# Patient Record
Sex: FEMALE | Race: BLACK OR AFRICAN AMERICAN | Employment: UNEMPLOYED | URBAN - METROPOLITAN AREA
[De-identification: names, ages, dates, MRNs, and addresses within clinical notes are randomized per-mention and may not be internally consistent; named-entity substitution may affect disease eponyms.]

---

## 2017-02-09 ENCOUNTER — ANESTHESIA (OUTPATIENT)
Dept: ENDOSCOPY | Age: 57
End: 2017-02-09
Payer: COMMERCIAL

## 2017-02-09 ENCOUNTER — SURGERY (OUTPATIENT)
Age: 57
End: 2017-02-09

## 2017-02-09 ENCOUNTER — ANESTHESIA EVENT (OUTPATIENT)
Dept: ENDOSCOPY | Age: 57
End: 2017-02-09
Payer: COMMERCIAL

## 2017-02-09 ENCOUNTER — HOSPITAL ENCOUNTER (OUTPATIENT)
Age: 57
Setting detail: OUTPATIENT SURGERY
Discharge: HOME | End: 2017-02-09
Attending: INTERNAL MEDICINE | Admitting: INTERNAL MEDICINE
Payer: COMMERCIAL

## 2017-02-09 VITALS
WEIGHT: 213 LBS | BODY MASS INDEX: 40.22 KG/M2 | RESPIRATION RATE: 18 BRPM | HEIGHT: 61 IN | OXYGEN SATURATION: 100 % | SYSTOLIC BLOOD PRESSURE: 122 MMHG | HEART RATE: 71 BPM | DIASTOLIC BLOOD PRESSURE: 76 MMHG | TEMPERATURE: 97.6 F

## 2017-02-09 PROCEDURE — 74011000250 HC RX REV CODE- 250

## 2017-02-09 PROCEDURE — 76060000031 HC ANESTHESIA FIRST 0.5 HR: Performed by: INTERNAL MEDICINE

## 2017-02-09 PROCEDURE — 74011250636 HC RX REV CODE- 250/636

## 2017-02-09 PROCEDURE — 77030009426 HC FCPS BIOP ENDOSC BSC -B: Performed by: INTERNAL MEDICINE

## 2017-02-09 PROCEDURE — 88305 TISSUE EXAM BY PATHOLOGIST: CPT | Performed by: INTERNAL MEDICINE

## 2017-02-09 PROCEDURE — 76040000019: Performed by: INTERNAL MEDICINE

## 2017-02-09 RX ORDER — ATROPINE SULFATE 0.1 MG/ML
0.5 INJECTION INTRAVENOUS
Status: DISCONTINUED | OUTPATIENT
Start: 2017-02-09 | End: 2017-02-09 | Stop reason: HOSPADM

## 2017-02-09 RX ORDER — NALOXONE HYDROCHLORIDE 0.4 MG/ML
0.4 INJECTION, SOLUTION INTRAMUSCULAR; INTRAVENOUS; SUBCUTANEOUS
Status: DISCONTINUED | OUTPATIENT
Start: 2017-02-09 | End: 2017-02-09 | Stop reason: HOSPADM

## 2017-02-09 RX ORDER — EPINEPHRINE 0.1 MG/ML
1 INJECTION INTRACARDIAC; INTRAVENOUS
Status: DISCONTINUED | OUTPATIENT
Start: 2017-02-09 | End: 2017-02-09 | Stop reason: HOSPADM

## 2017-02-09 RX ORDER — HYDROCHLOROTHIAZIDE 25 MG/1
25 TABLET ORAL DAILY
COMMUNITY

## 2017-02-09 RX ORDER — LIDOCAINE HYDROCHLORIDE 20 MG/ML
INJECTION, SOLUTION EPIDURAL; INFILTRATION; INTRACAUDAL; PERINEURAL AS NEEDED
Status: DISCONTINUED | OUTPATIENT
Start: 2017-02-09 | End: 2017-02-09 | Stop reason: HOSPADM

## 2017-02-09 RX ORDER — FENTANYL CITRATE 50 UG/ML
100 INJECTION, SOLUTION INTRAMUSCULAR; INTRAVENOUS
Status: DISCONTINUED | OUTPATIENT
Start: 2017-02-09 | End: 2017-02-09 | Stop reason: HOSPADM

## 2017-02-09 RX ORDER — FLUMAZENIL 0.1 MG/ML
0.2 INJECTION INTRAVENOUS
Status: DISCONTINUED | OUTPATIENT
Start: 2017-02-09 | End: 2017-02-09 | Stop reason: HOSPADM

## 2017-02-09 RX ORDER — SODIUM CHLORIDE 0.9 % (FLUSH) 0.9 %
5-10 SYRINGE (ML) INJECTION AS NEEDED
Status: DISCONTINUED | OUTPATIENT
Start: 2017-02-09 | End: 2017-02-09 | Stop reason: HOSPADM

## 2017-02-09 RX ORDER — SODIUM CHLORIDE 0.9 % (FLUSH) 0.9 %
5-10 SYRINGE (ML) INJECTION EVERY 8 HOURS
Status: DISCONTINUED | OUTPATIENT
Start: 2017-02-09 | End: 2017-02-09 | Stop reason: HOSPADM

## 2017-02-09 RX ORDER — MIDAZOLAM HYDROCHLORIDE 1 MG/ML
.25-1 INJECTION, SOLUTION INTRAMUSCULAR; INTRAVENOUS
Status: DISCONTINUED | OUTPATIENT
Start: 2017-02-09 | End: 2017-02-09 | Stop reason: HOSPADM

## 2017-02-09 RX ORDER — PROPOFOL 10 MG/ML
INJECTION, EMULSION INTRAVENOUS AS NEEDED
Status: DISCONTINUED | OUTPATIENT
Start: 2017-02-09 | End: 2017-02-09 | Stop reason: HOSPADM

## 2017-02-09 RX ORDER — DEXTROMETHORPHAN/PSEUDOEPHED 2.5-7.5/.8
1.2 DROPS ORAL
Status: DISCONTINUED | OUTPATIENT
Start: 2017-02-09 | End: 2017-02-09 | Stop reason: HOSPADM

## 2017-02-09 RX ORDER — DIPHENHYDRAMINE HYDROCHLORIDE 50 MG/ML
50 INJECTION, SOLUTION INTRAMUSCULAR; INTRAVENOUS ONCE
Status: DISCONTINUED | OUTPATIENT
Start: 2017-02-09 | End: 2017-02-09 | Stop reason: HOSPADM

## 2017-02-09 RX ORDER — SODIUM CHLORIDE 9 MG/ML
INJECTION, SOLUTION INTRAVENOUS
Status: DISCONTINUED | OUTPATIENT
Start: 2017-02-09 | End: 2017-02-09 | Stop reason: HOSPADM

## 2017-02-09 RX ORDER — SODIUM CHLORIDE 9 MG/ML
100 INJECTION, SOLUTION INTRAVENOUS CONTINUOUS
Status: DISCONTINUED | OUTPATIENT
Start: 2017-02-09 | End: 2017-02-09 | Stop reason: HOSPADM

## 2017-02-09 RX ADMIN — LIDOCAINE HYDROCHLORIDE 60 MG: 20 INJECTION, SOLUTION EPIDURAL; INFILTRATION; INTRACAUDAL; PERINEURAL at 11:29

## 2017-02-09 RX ADMIN — PROPOFOL 30 MG: 10 INJECTION, EMULSION INTRAVENOUS at 11:31

## 2017-02-09 RX ADMIN — PROPOFOL 30 MG: 10 INJECTION, EMULSION INTRAVENOUS at 11:33

## 2017-02-09 RX ADMIN — PROPOFOL 10 MG: 10 INJECTION, EMULSION INTRAVENOUS at 11:32

## 2017-02-09 RX ADMIN — PROPOFOL 80 MG: 10 INJECTION, EMULSION INTRAVENOUS at 11:29

## 2017-02-09 RX ADMIN — SODIUM CHLORIDE: 9 INJECTION, SOLUTION INTRAVENOUS at 11:16

## 2017-02-09 NOTE — ANESTHESIA PREPROCEDURE EVALUATION
Anesthetic History   No history of anesthetic complications            Review of Systems / Medical History  Patient summary reviewed, nursing notes reviewed and pertinent labs reviewed    Pulmonary  Within defined limits      Sleep apnea           Neuro/Psych   Within defined limits           Cardiovascular  Within defined limits                     GI/Hepatic/Renal  Within defined limits   GERD           Endo/Other  Within defined limits      Obesity and arthritis     Other Findings              Physical Exam    Airway  Mallampati: II  TM Distance: > 6 cm  Neck ROM: normal range of motion   Mouth opening: Normal     Cardiovascular  Regular rate and rhythm,  S1 and S2 normal,  no murmur, click, rub, or gallop             Dental  No notable dental hx       Pulmonary  Breath sounds clear to auscultation               Abdominal  GI exam deferred       Other Findings            Anesthetic Plan    ASA: 2  Anesthesia type: MAC          Induction: Intravenous  Anesthetic plan and risks discussed with: Patient

## 2017-02-09 NOTE — PROGRESS NOTES
Aislinn Brown  1960  [id number]    Situation:  Verbal report received from: DINESH THOMASON RN  Procedure: Procedure(s):  ESOPHAGOGASTRODUODENOSCOPY (EGD)  ESOPHAGOGASTRODUODENAL (EGD) BIOPSY    Background:    Preoperative diagnosis: DYSPHAGIA  Postoperative diagnosis: Dysphagia  Hiatial Hernia    :  Dr. Nii Blackman  Assistant(s): Endoscopy Technician-1: Shira Salcedo  Endoscopy RN-1: Clarissa Mendez RN    Specimens:   ID Type Source Tests Collected by Time Destination   1 : esophagus Preservative Esophagus, Distal  Jayant P. Walton Boxer, MD 2/9/2017 1139 Pathology     H. Pylori  no    Assessment:  Intra-procedure medications   Anesthesia gave intra-procedure sedation and medications, see anesthesia flow sheet yes    Intravenous fluids: NS@ KVO     Vital signs stable YES    Abdominal assessment: round and soft YES    Recommendation:  Discharge patient per MD order YES.   Return to floor N/A  Family or Friend YES  Permission to share finding with family or friend yes

## 2017-02-09 NOTE — ANESTHESIA POSTPROCEDURE EVALUATION
Post-Anesthesia Evaluation and Assessment    Patient: Aislinn Brown MRN: [id number]  SSN: [id number]    YOB: 1960  Age: 49 y.o. Sex: female       Cardiovascular Function/Vital Signs  Visit Vitals    /66    Pulse 74    Temp 36.4 °C (97.6 °F)    Resp 12    Ht 5' 1.25\" (1.556 m)    Wt 96.6 kg (213 lb)    SpO2 98%    BMI 39.92 kg/m2       Patient is status post MAC anesthesia for Procedure(s):  ESOPHAGOGASTRODUODENOSCOPY (EGD)  ESOPHAGOGASTRODUODENAL (EGD) BIOPSY. Nausea/Vomiting: None    Postoperative hydration reviewed and adequate. Pain:  Pain Scale 1: Numeric (0 - 10) (02/09/17 1149)  Pain Intensity 1: 0 (02/09/17 1149)   Managed    Neurological Status: At baseline    Mental Status and Level of Consciousness: Arousable    Pulmonary Status:   O2 Device: Room air (02/09/17 1149)   Adequate oxygenation and airway patent    Complications related to anesthesia: None    Post-anesthesia assessment completed.  No concerns    Signed By: Janelle Zhang MD     February 9, 2017

## 2017-02-09 NOTE — H&P
[address] [place] [address], [address]      History and Physical       NAME:  Aislinn Brown   :   1960   MRN:   [id number]             History of Present Illness:  Patient is a 49 y. o. who is seen for dysphagia. PMH:  Past Medical History   Diagnosis Date    Anxiety     Arthritis     Bipolar 1 disorder ([facility] [address]     Degenerative disk disease     Depression     Diabetes ([facility] [address]      \"border line\"    GERD (gastroesophageal reflux disease)     Mood disorder (HCC)      affective    JOSE RAFAEL (obstructive sleep apnea) 2012     AHI: 8.5 per hour of sleep       PSH:  No past surgical history on file. Allergies: Allergies   Allergen Reactions    Pcn [Penicillins] Hives    Sulfa (Sulfonamide Antibiotics) Hives       Home Medications:  Prior to Admission Medications   Prescriptions Last Dose Informant Patient Reported? Taking? DICLOFENAC SODIUM PO 2017 at Unknown time  Yes Yes   Sig: Take 1 Tab by mouth daily. OXYCODONE HCL (OXYCODONE PO) Not Taking at Unknown time  Yes No   Sig: Take  by mouth. 1 tab every 6 hrs/    alprazolam (XANAX) 0.5 mg tablet Unknown at Unknown time  Yes No   Sig: Take  by mouth nightly as needed. calcium 500 mg tab Not Taking at Unknown time  Yes No   Sig: Take 1 Tab by mouth daily. hydroCHLOROthiazide (HYDRODIURIL) 25 mg tablet 2017 at Unknown time  Yes Yes   Sig: Take 25 mg by mouth daily. multivitamin (ONE A DAY) tablet 2017 at Unknown time  Yes Yes   Sig: Take 1 Tab by mouth daily. omeprazole (PRILOSEC) 20 mg capsule 2017 at Unknown time  Yes Yes   Sig: Take 20 mg by mouth daily. risperidone (RISPERDAL) 4 mg tablet Not Taking at Unknown time  Yes No   Sig: Take  by mouth daily.       Facility-Administered Medications: None       Hospital Medications:  Current Facility-Administered Medications   Medication Dose Route Frequency    0.9% sodium chloride infusion  100 mL/hr IntraVENous CONTINUOUS    sodium chloride (NS) flush 5-10 mL  5-10 mL IntraVENous Q8H    sodium chloride (NS) flush 5-10 mL  5-10 mL IntraVENous PRN    midazolam (VERSED) injection 0.25-10 mg  0.25-10 mg IntraVENous Multiple    fentaNYL citrate (PF) injection 100 mcg  100 mcg IntraVENous Multiple    naloxone (NARCAN) injection 0.4 mg  0.4 mg IntraVENous Multiple    flumazenil (ROMAZICON) 0.1 mg/mL injection 0.2 mg  0.2 mg IntraVENous Multiple    simethicone (MYLICON) 08BE/0.3MM oral drops 80 mg  1.2 mL Oral Multiple    diphenhydrAMINE (BENADRYL) injection 50 mg  50 mg IntraVENous ONCE    atropine injection 0.5 mg  0.5 mg IntraVENous ONCE PRN    EPINEPHrine (ADRENALIN) 0.1 mg/mL syringe 1 mg  1 mg Endoscopically ONCE PRN       Social History:  Social History   Substance Use Topics    Smoking status: Former Smoker    Smokeless tobacco: Never Used    Alcohol use 0.0 oz/week     0 Standard drinks or equivalent per week      Comment: rarely       Family History:  Family History   Problem Relation Age of Onset    Breast Cancer Paternal Aunt     Breast Cancer Cousin              Review of Systems:      Constitutional: negative fever, negative chills, negative weight loss  Eyes:   negative visual changes  ENT:   negative sore throat, tongue or lip swelling  Respiratory:  negative cough, negative dyspnea  Cards:  negative for chest pain, palpitations, lower extremity edema  GI:   See HPI  :  negative for frequency, dysuria  Integument:  negative for rash and pruritus  Heme:  negative for easy bruising and gum/nose bleeding  Musculoskel: negative for myalgias,  back pain and muscle weakness  Neuro: negative for headaches, dizziness, vertigo  Psych:  negative for feelings of anxiety, depression       Objective:   Patient Vitals for the past 8 hrs:   BP Temp Pulse Resp SpO2 Height Weight   02/09/17 0956 138/74 98 °F (36.7 °C) 78 20 98 % 5' 1.25\" (1.556 m) 96.6 kg (213 lb)             EXAM:     NEURO-a&o   HEENT-wnl   LUNGS-clear    COR-regular rate and rhythym     ABD-soft , no tenderness, no rebound, good bs     EXT-no edema     Data Review     No results for input(s): WBC, HGB, HCT, PLT, HGBEXT, HCTEXT, PLTEXT in the last 72 hours. No results for input(s): NA, K, CL, CO2, BUN, CREA, GLU, PHOS, CA in the last 72 hours. No results for input(s): SGOT, GPT, AP, TBIL, TP, ALB, GLOB, GGT, AML, LPSE in the last 72 hours. No lab exists for component: AMYP, HLPSE  No results for input(s): INR, PTP, APTT in the last 72 hours. No lab exists for component: INREXT       Assessment:   · dysphagia     Patient Active Problem List   Diagnosis Code    Obstructive sleep apnea G47.33    Mood disorder (HCC) F39    Chronic pain syndrome G89.4    Morbid obesity ([facility] [address] E66.01     Plan:   · Endoscopic procedure with MAC     Signed By: Minnie Merino.  Becky Huynh MD     2/9/2017  11:14 AM

## 2017-02-09 NOTE — IP AVS SNAPSHOT
[address] 
[phone] Patient: Aislinn Brown MRN: [id number] BPJ:[birth year] You are allergic to the following Allergen Reactions Pcn (Penicillins) Hives Sulfa (Sulfonamide Antibiotics) Hives Recent Documentation Height Weight BMI OB Status Smoking Status 1.556 m 96.6 kg 39.92 kg/m2 Postmenopausal Former Smoker Emergency Contacts Name Discharge Info Relation Home Work [facility] DISCHARGE CAREGIVER [3] Spouse [3] [phone] About your hospitalization You were admitted on:  February 9, 2017 You last received care in the:  [facility] ENDOSCOPY You were discharged on:  February 9, 2017 Unit phone number:  [phone] Why you were hospitalized Your primary diagnosis was:  Not on File Providers Seen During Your Hospitalizations Provider Role Specialty Primary office phone Kye Browning MD Attending Provider Gastroenterology [phone] Your Primary Care Physician (PCP) Primary Care Physician Office Phone Office Fax Mini Rouse [phone] Follow-up Information None Current Discharge Medication List  
  
CONTINUE these medications which have NOT CHANGED Dose & Instructions Dispensing Information Comments Morning Noon Evening Bedtime ALPRAZolam 0.5 mg tablet Commonly known as:  Ganga Sequin Your next dose is: Today, Tomorrow Other:  _________ Take  by mouth nightly as needed. Refills:  0  
     
   
   
   
  
 calcium 500 mg Tab Your next dose is: Today, Tomorrow Other:  _________ Dose:  1 Tab Take 1 Tab by mouth daily. Refills:  0 DICLOFENAC SODIUM PO Your next dose is: Today, Tomorrow Other:  _________ Dose:  1 Tab Take 1 Tab by mouth daily. Refills:  0  
     
   
   
   
  
 hydroCHLOROthiazide 25 mg tablet Commonly known as:  HYDRODIURIL Your next dose is: Today, Tomorrow Other:  _________ Dose:  25 mg Take 25 mg by mouth daily. Refills:  0  
     
   
   
   
  
 multivitamin tablet Commonly known as:  ONE A DAY Your next dose is: Today, Tomorrow Other:  _________ Dose:  1 Tab Take 1 Tab by mouth daily. Refills:  0 OXYCODONE PO Your next dose is: Today, Tomorrow Other:  _________ Take  by mouth. 1 tab every 6 hrs/  
 Refills:  0 PriLOSEC 20 mg capsule Generic drug:  omeprazole Your next dose is: Today, Tomorrow Other:  _________ Dose:  20 mg Take 20 mg by mouth daily. Refills:  0  
     
   
   
   
  
 risperiDONE 4 mg tablet Commonly known as:  RisperDAL Your next dose is: Today, Tomorrow Other:  _________ Take  by mouth daily. Refills:  0 Discharge Instructions [address] 
[address] [facility], [address] [place] [address] EGD DISCHARGE INSTRUCTIONS Aislinn Brown [id number] 
1960 Discomfort: 
Sore throat- throat lozenges or warm salt water gargle 
redness at IV site- apply warm compress to area; if redness or soreness persist- contact your physician Gaseous discomfort- walking, belching will help relieve any discomfort You may not operate a vehicle for 12 hours You may not engage in an occupation involving machinery or appliances for rest of today You may not drink alcoholic beverages for at least 12 hours Avoid making any critical decisions for at least 24 hour DIET You may resume your regular diet  however -  remember your colon is empty and a heavy meal will produce gas. Avoid these foods:  vegetables, fried / greasy foods, carbonated drinks ACTIVITY You may resume your normal daily activities Spend the remainder of the day resting -  avoid any strenuous activity. CALL M.D. ANY SIGN OF Increasing pain, nausea, vomiting Abdominal distension (swelling) New increased bleeding (oral or rectal) Fever (chills) Pain in chest area Bloody discharge from nose or mouth Shortness of breath Follow-up Instructions: 
 Call Dr. Karan Amaral for any questions or problems. Telephone # [phone] ENDOSCOPY FINDINGS: 
 Your endoscopy showed a hiatal hernia. Please continue your omeprazole. Biopsies of your esophagus, which was otherwise normal appearing, were taken. We will contact you about the pathology results. Signed By: Sandeep Mohamud. Xiomara Garcia MD   
 2/9/2017  11:39 AM 
  
 
 
 
  
Hiatal Hernia: Care Instructions Your Care Instructions A hiatal hernia occurs when part of the stomach bulges into the chest cavity. A hiatal hernia may allow stomach acid and juices to back up into the esophagus (acid reflux). This can cause a feeling of burning, warmth, heat, or pain behind the breastbone. This feeling may often occur after you eat, soon after you lie down, or when you bend forward, and it may come and go. You also may have a sour taste in your mouth. These symptoms are commonly known as heartburn or reflux. But not all hiatal hernias cause symptoms. Follow-up care is a key part of your treatment and safety. Be sure to make and go to all appointments, and call your doctor if you are having problems. Its also a good idea to know your test results and keep a list of the medicines you take. How can you care for yourself at home? · Take your medicines exactly as prescribed. Call your doctor if you think you are having a problem with your medicine.  
· Do not take aspirin or other nonsteroidal anti-inflammatory drugs (NSAIDs), such as ibuprofen (Advil, Motrin) or naproxen (Aleve), unless your doctor says it is okay. Ask your doctor what you can take for pain. · Your doctor may recommend over-the-counter medicine. For mild or occasional indigestion, antacids such as Tums, Gaviscon, Maalox, or Mylanta may help. Your doctor also may recommend over-the-counter acid reducers, such as famotidine (Pepcid AC), cimetidine (Tagamet HB), ranitidine (Zantac 75 and Zantac 150), or omeprazole (Prilosec). Read and follow all instructions on the label. If you use these medicines often, talk with your doctor. · Change your eating habits. ¨ Its best to eat several small meals instead of two or three large meals. ¨ After you eat, wait 2 to 3 hours before you lie down. Late-night snacks arent a good idea. ¨ Chocolate, mint, and alcohol can make heartburn worse. They relax the valve between the esophagus and the stomach. ¨ Spicy foods, foods that have a lot of acid (like tomatoes and oranges), and coffee can make heartburn symptoms worse in some people. If your symptoms are worse after you eat a certain food, you may want to stop eating that food to see if your symptoms get better. · Do not smoke or chew tobacco. 
· If you get heartburn at night, raise the head of your bed 6 to 8 inches by putting the frame on blocks or placing a foam wedge under the head of your mattress. (Adding extra pillows does not work.) · Do not wear tight clothing around your middle. · Lose weight if you need to. Losing just 5 to 10 pounds can help. When should you call for help? Call 911 anytime you think you may need emergency care. For example, call if: 
· You have symptoms of a heart attack such as: ¨ Chest pain or pressure. ¨ Sweating. ¨ Shortness of breath. ¨ Nausea or vomiting. ¨ Pain that spreads from the chest to the neck, jaw, or one or both shoulders or arms. ¨ Dizziness or lightheadedness. ¨ A fast or uneven pulse. After calling 911, chew 1 adult-strength aspirin. Wait for an ambulance. Do not try to drive yourself. · You vomit blood or what looks like coffee grounds. · You pass maroon or very bloody stools. Call your doctor now or seek immediate medical care if: 
· You have new or different belly pain. · Your stools are black and tarlike or have streaks of blood. · Food seems to catch in your throat or chest. 
Watch closely for changes in your health, and be sure to contact your doctor if: 
· Your symptoms get worse. · Your symptoms have not improved after 2 days. Where can you learn more? Go to http://dianne-zaida.info/. Enter I123 in the search box to learn more about \"Hiatal Hernia: Care Instructions. \" Current as of: August 9, 2016 Content Version: 11.1 © 8267-8525 contrib.com. Care instructions adapted under license by [employer] (which disclaims liability or warranty for this information). If you have questions about a medical condition or this instruction, always ask your healthcare professional. [place] [address] any warranty or liability for your use of this information. Discharge Orders None Introducing hospitals & HEALTH SERVICES! [employer] introduces [employer] patient portal. Now you can access parts of your medical record, email your doctor's office, and request medication refills online. 1. In your internet browser, go to [email or web address] [email or web address] 2. Click on the First Time User? Click Here link in the Sign In box. You will see the New Member Sign Up page. 3. Enter your [employer] Access Code exactly as it appears below. You will not need to use this code after youve completed the sign-up process. If you do not sign up before the expiration date, you must request a new code. · [employer] Access Code: [email or web address] Expires: 5/10/2017  9:09 AM 
 
4. Enter the last four digits of your Social Security Number (xxxx) and Date of Birth (mm/dd/yyyy) as indicated and click Submit.  You will be taken to the next sign-up page. 5. Create a [employer] ID. This will be your [employer] login ID and cannot be changed, so think of one that is secure and easy to remember. 6. Create a [employer] password. You can change your password at any time. 7. Enter your Password Reset Question and Answer. This can be used at a later time if you forget your password. 8. Enter your e-mail address. You will receive e-mail notification when new information is available in [address] 9. Click Sign Up. You can now view and download portions of your medical record. 10. Click the Download Summary menu link to download a portable copy of your medical information. If you have questions, please visit the Frequently Asked Questions section of the [employer] website. Remember, [employer] is NOT to be used for urgent needs. For medical emergencies, dial 911. Now available from your iPhone and Android! General Information Please provide this summary of care documentation to your next provider. Patient Signature:  ____________________________________________________________ Date:  ____________________________________________________________  
  
Patrick Artist Provider Signature:  ____________________________________________________________ Date:  ____________________________________________________________

## 2017-02-09 NOTE — PROCEDURES
[address], [address] [place] [address]          Esophago- Gastroduodenoscopy (EGD) Procedure Note    Aislinn Brown  1960  [id number]      Procedure: Endoscopic Gastroduodenoscopy --diagnostic, with biopsy    Indication: dysphagia    Pre-operative Diagnosis: see indication above    Post-operative Diagnosis: see findings below    : Jocelyne Feng MD    Referring Provider:  Dave Brice MD      Anesthesia/Sedation:  MAC anesthesia Propofol        Procedure Details     After informed consent was obtained for the procedure, with all risks and benefits of procedure explained the patient was taken to the endoscopy suite and placed in the left lateral decubitus position. Following sequential administration of sedation as per above, the endoscope was inserted into the mouth and advanced under direct vision to second portion of the duodenum. A careful inspection was made as the gastroscope was withdrawn, including a retroflexed view of the proximal stomach; findings and interventions are described below. Findings:   Esophagus: small hiatal hernia, cold biopsies taken of esophagus to rule out eosinophilic esophagitis  Stomach: normal   Duodenum: normal      Therapies:  As above    Specimens: 1. Esophagus         EBL: None      Complications:   None; patient tolerated the procedure well. Impression:    As above    Recommendations:  1. Follow up surgical pathology  2. Continue PPI    Signed By: Jocelyne Feng MD     2/9/2017  11:40 AM

## 2017-02-09 NOTE — DISCHARGE INSTRUCTIONS
[address] [facility] [place] [address], [address] [facility]    EGD DISCHARGE INSTRUCTIONS    Aislinn Brown  [id number]  1960    Discomfort:  Sore throat- throat lozenges or warm salt water gargle  redness at IV site- apply warm compress to area; if redness or soreness persist- contact your physician  Gaseous discomfort- walking, belching will help relieve any discomfort  You may not operate a vehicle for 12 hours  You may not engage in an occupation involving machinery or appliances for rest of today  You may not drink alcoholic beverages for at least 12 hours  Avoid making any critical decisions for at least 24 hour  DIET  You may resume your regular diet - however -  remember your colon is empty and a heavy meal will produce gas. Avoid these foods:  vegetables, fried / greasy foods, carbonated drinks    ACTIVITY  You may resume your normal daily activities   Spend the remainder of the day resting -  avoid any strenuous activity. CALL M.D. ANY SIGN OF   Increasing pain, nausea, vomiting  Abdominal distension (swelling)  New increased bleeding (oral or rectal)  Fever (chills)  Pain in chest area  Bloody discharge from nose or mouth  Shortness of breath    Follow-up Instructions:   Call Dr. Sedalia Barthel for any questions or problems. Telephone # [phone]    ENDOSCOPY FINDINGS:   Your endoscopy showed a hiatal hernia. Please continue your omeprazole. Biopsies of your esophagus, which was otherwise normal appearing, were taken. We will contact you about the pathology results. Signed By: Adelia Smith. Riddhi Wan MD     2/9/2017  11:39 AM              Hiatal Hernia: Care Instructions  Your Care Instructions  A hiatal hernia occurs when part of the stomach bulges into the chest cavity. A hiatal hernia may allow stomach acid and juices to back up into the esophagus (acid reflux). This can cause a feeling of burning, warmth, heat, or pain behind the breastbone.  This feeling may often occur after you eat, soon after you lie down, or when you bend forward, and it may come and go. You also may have a sour taste in your mouth. These symptoms are commonly known as heartburn or reflux. But not all hiatal hernias cause symptoms. Follow-up care is a key part of your treatment and safety. Be sure to make and go to all appointments, and call your doctor if you are having problems. Its also a good idea to know your test results and keep a list of the medicines you take. How can you care for yourself at home? · Take your medicines exactly as prescribed. Call your doctor if you think you are having a problem with your medicine. · Do not take aspirin or other nonsteroidal anti-inflammatory drugs (NSAIDs), such as ibuprofen (Advil, Motrin) or naproxen (Aleve), unless your doctor says it is okay. Ask your doctor what you can take for pain. · Your doctor may recommend over-the-counter medicine. For mild or occasional indigestion, antacids such as Tums, Gaviscon, Maalox, or Mylanta may help. Your doctor also may recommend over-the-counter acid reducers, such as famotidine (Pepcid AC), cimetidine (Tagamet HB), ranitidine (Zantac 75 and Zantac 150), or omeprazole (Prilosec). Read and follow all instructions on the label. If you use these medicines often, talk with your doctor. · Change your eating habits. ¨ Its best to eat several small meals instead of two or three large meals. ¨ After you eat, wait 2 to 3 hours before you lie down. Late-night snacks arent a good idea. ¨ Chocolate, mint, and alcohol can make heartburn worse. They relax the valve between the esophagus and the stomach. ¨ Spicy foods, foods that have a lot of acid (like tomatoes and oranges), and coffee can make heartburn symptoms worse in some people. If your symptoms are worse after you eat a certain food, you may want to stop eating that food to see if your symptoms get better.   · Do not smoke or chew tobacco.  · If you get heartburn at night, raise the head of your bed 6 to 8 inches by putting the frame on blocks or placing a foam wedge under the head of your mattress. (Adding extra pillows does not work.)  · Do not wear tight clothing around your middle. · Lose weight if you need to. Losing just 5 to 10 pounds can help. When should you call for help? Call 911 anytime you think you may need emergency care. For example, call if:  · You have symptoms of a heart attack such as:  ¨ Chest pain or pressure. ¨ Sweating. ¨ Shortness of breath. ¨ Nausea or vomiting. ¨ Pain that spreads from the chest to the neck, jaw, or one or both shoulders or arms. ¨ Dizziness or lightheadedness. ¨ A fast or uneven pulse. After calling 911, chew 1 adult-strength aspirin. Wait for an ambulance. Do not try to drive yourself. · You vomit blood or what looks like coffee grounds. · You pass maroon or very bloody stools. Call your doctor now or seek immediate medical care if:  · You have new or different belly pain. · Your stools are black and tarlike or have streaks of blood. · Food seems to catch in your throat or chest.  Watch closely for changes in your health, and be sure to contact your doctor if:  · Your symptoms get worse. · Your symptoms have not improved after 2 days. Where can you learn more? Go to http://dianne-zaida.info/. Enter F871 in the search box to learn more about \"Hiatal Hernia: Care Instructions. \"  Current as of: August 9, 2016  Content Version: 11.1  © 4076-5895 [employer]. Care instructions adapted under license by [employer] (which disclaims liability or warranty for this information). If you have questions about a medical condition or this instruction, always ask your healthcare professional. [address] any warranty or liability for your use of this information.

## 2017-10-31 ENCOUNTER — HOSPITAL ENCOUNTER (OUTPATIENT)
Dept: MAMMOGRAPHY | Age: 57
Discharge: HOME | End: 2017-10-31
Attending: INTERNAL MEDICINE
Payer: COMMERCIAL

## 2017-10-31 DIAGNOSIS — Z12.31 VISIT FOR SCREENING MAMMOGRAM: ICD-10-CM

## 2017-10-31 PROCEDURE — 77067 SCR MAMMO BI INCL CAD: CPT

## 2018-01-25 ENCOUNTER — TELEPHONE (OUTPATIENT)
Dept: SLEEP MEDICINE | Age: 58
End: 2018-01-25

## 2018-01-25 DIAGNOSIS — G47.33 OSA (OBSTRUCTIVE SLEEP APNEA): Primary | ICD-10-CM

## 2018-01-25 NOTE — TELEPHONE ENCOUNTER
Orders Placed This Encounter    AMB SUPPLY ORDER     Diagnosis: (G47.33) JOSE RAFAEL (obstructive sleep apnea)  (primary encounter diagnosis)     Replacement Supplies for Positive Airway Pressure Therapy Device:   Duration of need: 99 months.  Oral/Nasal Combo Mask 1 every 3 months.  Oral Cushion Combo Mask (Replace) 2 per month.  Nasal Pillows Combo Mask (Replace) 2 per month.  Full Face Mask 1 every 3 months.  Full Face Mask Cushion 1 per month.  Nasal Cushion (Replace) 2 per month.  Nasal Pillows (Replace) 2 per month.  Nasal Interface Mask 1 every 3 months.  Headgear 1 every 6 months.  Chinstrap 1 every 6 months.  Tubing 1 every 3 months.  Filter(s) Disposable 2 per month.  Filter(s) Non-Disposable 1 every 6 months.  Oral Interface 1 every 3 months. [address] for [employer] (Replace) 1 every 6 months.  Tubing with heating element 1 every 3 months. Perform Mask Fitting per patient preference and comfort - replace as above. Philomena Basurto MD, FAA; NPI: [id number]    Electronically signed.  Date:- 01/25/18

## 2018-01-26 ENCOUNTER — DOCUMENTATION ONLY (OUTPATIENT)
Dept: SLEEP MEDICINE | Age: 58
End: 2018-01-26

## 2018-05-15 ENCOUNTER — DOCUMENTATION ONLY (OUTPATIENT)
Dept: SLEEP MEDICINE | Age: 58
End: 2018-05-15

## 2018-05-15 ENCOUNTER — OFFICE VISIT (OUTPATIENT)
Dept: SLEEP MEDICINE | Age: 58
End: 2018-05-15

## 2018-05-15 VITALS
WEIGHT: 216.8 LBS | OXYGEN SATURATION: 98 % | BODY MASS INDEX: 40.93 KG/M2 | SYSTOLIC BLOOD PRESSURE: 140 MMHG | DIASTOLIC BLOOD PRESSURE: 91 MMHG | HEIGHT: 61 IN

## 2018-05-15 DIAGNOSIS — G47.33 OBSTRUCTIVE SLEEP APNEA: Primary | ICD-10-CM

## 2018-05-15 DIAGNOSIS — Z72.821 INADEQUATE SLEEP HYGIENE: ICD-10-CM

## 2018-05-15 DIAGNOSIS — F39 MOOD DISORDER (HCC): ICD-10-CM

## 2018-05-15 RX ORDER — ACYCLOVIR 400 MG/1
TABLET ORAL
COMMUNITY

## 2018-05-15 RX ORDER — FLUOXETINE HYDROCHLORIDE 40 MG/1
CAPSULE ORAL
COMMUNITY

## 2018-05-15 RX ORDER — CLINDAMYCIN HYDROCHLORIDE 150 MG/1
CAPSULE ORAL
COMMUNITY

## 2018-05-15 RX ORDER — CLINDAMYCIN HYDROCHLORIDE 300 MG/1
CAPSULE ORAL
COMMUNITY

## 2018-05-15 RX ORDER — AMMONIUM LACTATE 12 G/100G
LOTION TOPICAL
COMMUNITY

## 2018-05-15 RX ORDER — ATORVASTATIN CALCIUM 20 MG/1
TABLET, FILM COATED ORAL
COMMUNITY

## 2018-05-15 RX ORDER — IBUPROFEN 600 MG/1
TABLET ORAL
COMMUNITY

## 2018-05-15 RX ORDER — FLUOXETINE 20 MG/1
TABLET ORAL
COMMUNITY

## 2018-05-15 RX ORDER — ERYTHROMYCIN 250 MG/1
TABLET, COATED ORAL
COMMUNITY

## 2018-05-15 NOTE — PATIENT INSTRUCTIONS
[address] [facility] [place]. [place], [address]  Tel.  [phone]  Fax. 100 [facility] 60  [place], [address] [facility]  Tel.  [phone]  Fax. [phone] [address]  Tel.  [phone]  Fax. [phone]     Learning About CPAP for Sleep Apnea  What is CPAP? CPAP is a small machine that you use at home every night while you sleep. It increases air pressure in your throat to keep your airway open. When you have sleep apnea, this can help you sleep better so you feel much better. CPAP stands for \"continuous positive airway pressure. \"  The CPAP machine will have one of the following:  · A mask that covers your nose and mouth  · Prongs that fit into your nose  · A mask that covers your nose only, the most common type. This type is called NCPAP. The N stands for \"nasal.\"  Why is it done? CPAP is usually the best treatment for obstructive sleep apnea. It is the first treatment choice and the most widely used. Your doctor may suggest CPAP if you have:  · Moderate to severe sleep apnea. · Sleep apnea and coronary artery disease (CAD) or heart failure. How does it help? · CPAP can help you have more normal sleep, so you feel less sleepy and more alert during the daytime. · CPAP may help keep heart failure or other heart problems from getting worse. · NCPAP may help lower your blood pressure. · If you use CPAP, your bed partner may also sleep better because you are not snoring or restless. What are the side effects? Some people who use CPAP have:  · A dry or stuffy nose and a sore throat. · Irritated skin on the face. · Sore eyes. · Bloating. If you have any of these problems, work with your doctor to fix them. Here are some things you can try:  · Be sure the mask or nasal prongs fit well. · See if your doctor can adjust the pressure of your CPAP. · If your nose is dry, try a humidifier.   · If your nose is runny or stuffy, try decongestant medicine or a steroid nasal spray. If these things do not help, you might try a different type of machine. Some machines have air pressure that adjusts on its own. Others have air pressures that are different when you breathe in than when you breathe out. This may reduce discomfort caused by too much pressure in your nose. Where can you learn more? Go to Blushr.be  Enter Sarah López in the search box to learn more about \"Learning About CPAP for Sleep Apnea. \"   © 8762-0457 Healthwise, [employer]. Care instructions adapted under license by Yudith [employer] (which disclaims liability or warranty for this information). This care instruction is for use with your licensed healthcare professional. If you have questions about a medical condition or this instruction, always ask your healthcare professional. [address] any warranty or liability for your use of this information. Content Version: 6.7.00862; Last Revised: January 11, 2010  PROPER SLEEP HYGIENE    What to avoid  · Do not have drinks with caffeine, such as coffee or black tea, for 8 hours before bed. · Do not smoke or use other types of tobacco near bedtime. Nicotine is a stimulant and can keep you awake. · Avoid drinking alcohol late in the evening, because it can cause you to wake in the middle of the night. · Do not eat a big meal close to bedtime. If you are hungry, eat a light snack. · Do not drink a lot of water close to bedtime, because the need to urinate may wake you up during the night. · Do not read or watch TV in bed. Use the bed only for sleeping and sexual activity. What to try  · Go to bed at the same time every night, and wake up at the same time every morning. Do not take naps during the day. · Keep your bedroom quiet, dark, and cool. · Get regular exercise, but not within 3 to 4 hours of your bedtime. .  · Sleep on a comfortable pillow and mattress.   · If watching the clock makes you anxious, turn it facing away from you so you cannot see the time. · If you worry when you lie down, start a worry book. Well before bedtime, write down your worries, and then set the book and your concerns aside. · Try meditation or other relaxation techniques before you go to bed. · If you cannot fall asleep, get up and go to another room until you feel sleepy. Do something relaxing. Repeat your bedtime routine before you go to bed again. · Make your house quiet and calm about an hour before bedtime. Turn down the lights, turn off the TV, log off the computer, and turn down the volume on music. This can help you relax after a busy day. Drowsy Driving: The [address] [facility] cites drowsiness as a causing factor in more than 924,944 police reported crashes annually, resulting in 76,000 injuries and 1,500 deaths. Other surveys suggest 55% of people polled have driven while drowsy in the past year, 23% had fallen asleep but not crashed, 3% crashed, and 2% had and accident due to drowsy driving. Who is at risk? Young Drivers: One study of drowsy driving accidents states that 55% of the drivers were under 25 years. Of those, 75% were male. Shift Workers and Travelers: People who work overnight or travel across time zones frequently are at higher risk of experiencing Circadian Rhythm Disorders. They are trying to work and function when their body is programed to sleep. Sleep Deprived: Lack of sleep has a serious impact on your ability to pay attention or focus on a task. Consistently getting less than the average of 8 hours your body needs creates partial or cumulative sleep deprivation. Untreated Sleep Disorders: Sleep Apnea, Narcolepsy, R.L.S., and other sleep disorders (untreated) prevent a person from getting enough restful sleep. This leads to excessive daytime sleepiness and increases the risk for drowsy driving accidents by up to 7 times.   Medications / Alcohol: Even over the counter medications can cause drowsiness. Medications that impair a drivers attention should have a warning label. Alcohol naturally makes you sleepy and on its own can cause accidents. Combined with excessive drowsiness its effects are amplified. Signs of Drowsy Driving:   * You don't remember driving the last few miles   * You may drift out of your nidia   * You are unable to focus and your thoughts wander   * You may yawn more often than normal   * You have difficulty keeping your eyes open / nodding off   * Missing traffic signs, speeding, or tailgating  Prevention-   Good sleep hygiene, lifestyle and behavioral choices have the most impact on drowsy driving. There is no substitute for sleep and the average person requires 8 hours nightly. If you find yourself driving drowsy, stop and sleep. Consider the sleep hygiene tips provided during your visit as well. Medication Refill Policy: Refills for all medications require 1 week advance notice. Please have your pharmacy fax a refill request. We are unable to fax, or call in \"controled substance\" medications and you will need to pick these prescriptions up from our office. KochAbo Activation    Thank you for requesting access to KochAbo. Please follow the instructions below to securely access and download your online medical record. KochAbo allows you to send messages to your doctor, view your test results, renew your prescriptions, schedule appointments, and more. How Do I Sign Up? 1. In your internet browser, go to [email or web address]. 2. Click on the First Time User? Click Here link in the Sign In box. You will see the New Member Sign Up page. 3. Enter your KochAbo Access Code exactly as it appears below. You will not need to use this code after youve completed the sign-up process. If you do not sign up before the expiration date, you must request a new code.     KochAbo Access Code: [email or web address]  Expires: 2018 11:11 AM (This is the date your TapZilla access code will )    4. Enter the last four digits of your Social Security Number (xxxx) and Date of Birth (mm/dd/yyyy) as indicated and click Submit. You will be taken to the next sign-up page. 5. Create a TapZilla ID. This will be your TapZilla login ID and cannot be changed, so think of one that is secure and easy to remember. 6. Create a TapZilla password. You can change your password at any time. 7. Enter your Password Reset Question and Answer. This can be used at a later time if you forget your password. 8. Enter your e-mail address. You will receive e-mail notification when new information is available in [address] 9. Click Sign Up. You can now view and download portions of your medical record. 10. Click the Download Summary menu link to download a portable copy of your medical information. Additional Information    If you have questions, please call [phone]. Remember, TapZilla is NOT to be used for urgent needs. For medical emergencies, dial 911.

## 2018-05-15 NOTE — PROGRESS NOTES
[address] [facility] [[email or web address]]. [place], [address]  Tel.  [phone]  Fax. 100 [facility] 60  [place], [address] [facility]  Tel.  [phone]  Fax. [phone] [address]   Tel.  [phone]  Fax. [phone]     S>Aislinn Brown is a 66 y.o. female seen for a positive airway pressure follow-up. She reports no problems using the device. She is 60% compliant over the past 90 days. The following problems are identified:    Drowsiness no Problems exhaling no   Snoring no Forget to put on no   Mask Comfortable yes Can't fall asleep no   Dry Mouth no Mask falls off no   Air Leaking no Frequent awakenings yes         She reports of getting in bed by 11:00 pm but remains awake viewing television or reading until about midnight and then wakes up frequent until her final rise time at 6:00 am.  She remains tired during the day and has to nap for 60 to 90 minutes and is energetic and ready to go after the nap. Allergies   Allergen Reactions    Pcn [Penicillins] Hives    Sulfa (Sulfonamide Antibiotics) Hives       She has a current medication list which includes the following prescription(s): acyclovir, ammonium lactate, atorvastatin, atorvastatin, clindamycin, clindamycin, ibuprofen, hydrochlorothiazide, omeprazole, multivitamin, alprazolam, oxycodone hcl, fluoxetine, fluoxetine, erythromycin base, calcium, diclofenac sodium, and risperidone. .      She  has a past medical history of Anxiety; Arthritis; Bipolar 1 disorder ([facility] [address] Degenerative disk disease; Depression; Diabetes ([facility] [address] GERD (gastroesophageal reflux disease); Mood disorder ([facility] and JOSE RAFAEL (obstructive sleep apnea) (06/04/2012). Austin Sleepiness Score: 4   and Modified F.O.S.Q. Score Total / 2: 17.5   which reflect improved sleep quality over therapy time.     O>    Visit Vitals    BP (!) 140/91    Ht 5' 1.25\" (1.556 m)    Wt 216 lb 12.8 oz (98.3 kg)    SpO2 98%    BMI 40.63 kg/m2 General:   Not in acute distress   Eyes:  Anicteric sclerae, no obvious strabismus   Nose:  No obvious nasal septum deviation    Oropharynx:   Class 4 oropharyngeal outlet, thick tongue base, uvula not seen due to low-lying soft palate, narrow tonsilo-pharyngeal pilars   Tonsils:   tonsils are not visualized due to low-lying soft palate   Neck:   midline trachea   Chest/Lungs:  Equal lung expansion, clear on auscultation    CVS:  Normal rate, regular rhythm; no JVD   Skin:  Warm to touch; no obvious rashes   Neuro:  No focal deficits ; no obvious tremor    Psych:  Normal affect,  normal countenance;           A>    ICD-10-CM ICD-9-CM    1. Obstructive sleep apnea G47.33 327.23 AMB SUPPLY ORDER      SLEEP LAB (PAP TITRATION)   2. BMI 40.0-44.9, adult ([facility] Z68.41 V85.41    3. Inadequate sleep hygiene Z72.821 307.49    4. Mood disorder ([facility] F39 296.90      AHI = 8.5 (2012). On CPAP :  8 cmH2O. Compliant:      yes    Therapeutic Response:  Positive    P>    * We have recommended a dedicated weight loss through appropriate diet and an exercise regiment as significant weight reduction has been shown to reduce severity of obstructive sleep apnea. * Follow-up Disposition:  Return in about 1 year (around 5/15/2019), or if symptoms worsen or fail to improve. * She was asked to contact our office for any problems regarding PAP therapy. * Counseling was provided regarding the importance of regular PAP use and on proper sleep hygiene and safe driving. * Re-enforced proper and regular cleaning for the device. Thank you for allowing us to participate in your patient's medical care. Errol Prescott MD, FAASM  Electronically signed.  05/15/18

## 2018-05-15 NOTE — MR AVS SNAPSHOT
Diogo Penaloza 
 
 
 [address] [facility] [address] [address] 
[phone] Patient: Aislinn Brown MRN: [id number] TII:5/49/8046 Visit Information Date & Time Provider Department Dept. Phone Encounter #  
 5/15/2018 11:40 AM Camilo Smith MD [address] [address] [id number] Follow-up Instructions Return in about 1 year (around 5/15/2019), or if symptoms worsen or fail to improve. Follow-up and Disposition History Your Appointments 5/15/2018 11:40 AM  
Any with Camilo Smith MD  
[address] [facility] ([address]) Appt Note: yearly follow up [address] [facility] [address]  
  
   
 [address] [address] Upcoming Health Maintenance Date Due Hepatitis C Screening 1960 DTaP/Tdap/Td series (1 - Tdap) 4/10/1983 PAP AKA CERVICAL CYTOLOGY 4/10/1983 FOBT Q 1 YEAR AGE 50-75 4/10/2012 Influenza Age 5 to Adult 8/1/2018 BREAST CANCER SCRN MAMMOGRAM 10/31/2019 Allergies as of 5/15/2018  Review Complete On: 5/15/2018 By: Camilo Smith MD  
  
 Severity Noted Reaction Type Reactions Pcn [Penicillins]  02/09/2012    Hives Sulfa (Sulfonamide Antibiotics)  08/02/2011    Hives Current Immunizations  Never Reviewed No immunizations on file. Not reviewed this visit You Were Diagnosed With   
  
 Codes Comments Obstructive sleep apnea    -  Primary ICD-10-CM: G47.33 
ICD-9-CM: 327.23 BMI 40.0-44.9, adult [facility])     ICD-10-CM: Z68.41 
ICD-9-CM: V85.41 Inadequate sleep hygiene     ICD-10-CM: E60.250 ICD-9-CM: 307.49 Mood disorder ([facility] [address]     ICD-10-CM: F39 
ICD-9-CM: 296.90 Vitals BP Height(growth percentile) Weight(growth percentile) SpO2 BMI OB Status (!) 140/91 5' 1.25\" (1.556 m) 216 lb 12.8 oz (98.3 kg) 98% 40.63 kg/m2 Postmenopausal  
 Smoking Status Former Smoker Vitals History BMI and BSA Data Body Mass Index Body Surface Area  
 40.63 kg/m 2 2.06 m 2 Your Updated Medication List  
  
   
This list is accurate as of 5/15/18 11:19 AM.  Always use your most recent med list.  
  
  
  
  
 acyclovir 400 mg tablet Commonly known as:  ZOVIRAX  
acyclovir 400 mg tablet ALPRAZolam 0.5 mg tablet Commonly known as:  Ellie  Take  by mouth nightly as needed. ammonium lactate 12 % lotion Commonly known as:  LAC-HYDRIN  
ammonium lactate 12 % lotion * atorvastatin 20 mg tablet Commonly known as:  LIPITOR  
atorvastatin 20 mg tablet * atorvastatin 20 mg tablet Commonly known as:  LIPITOR  
take 1 tablet by mouth once daily  
  
 calcium 500 mg Tab Take 1 Tab by mouth daily. * clindamycin 150 mg capsule Commonly known as:  CLEOCIN  
take 1 capsule by mouth every 6 hours for 7 days * clindamycin 300 mg capsule Commonly known as:  CLEOCIN  
clindamycin HCl 300 mg capsule DICLOFENAC SODIUM PO Take 1 Tab by mouth daily. erythromycin base 250 mg tablet Commonly known as:  E-MYCIN  
erythromycin 250 mg tablet * FLUoxetine 20 mg tablet Commonly known as:  PROzac  
fluoxetine 20 mg tablet * FLUoxetine 40 mg capsule Commonly known as:  PROzac  
fluoxetine 40 mg capsule  
  
 hydroCHLOROthiazide 25 mg tablet Commonly known as:  HYDRODIURIL Take 25 mg by mouth daily. ibuprofen 600 mg tablet Commonly known as:  MOTRIN  
take 1 tablet by mouth every 6 to 8 hours if needed for pain  
  
 multivitamin tablet Commonly known as:  ONE A DAY Take 1 Tab by mouth daily. OXYCODONE PO Take  by mouth. 1 tab every 6 hrs/  
  
 PriLOSEC 20 mg capsule Generic drug:  omeprazole Take 20 mg by mouth daily. risperiDONE 4 mg tablet Commonly known as:  RisperDAL Take  by mouth daily. * Notice: This list has 6 medication(s) that are the same as other medications prescribed for you. Read the directions carefully, and ask your doctor or other care provider to review them with you. We Performed the Following AMB SUPPLY ORDER [[id number] Custom] Comments:  
 Diagnosis: (G47.33) JOSE RAFAEL (obstructive sleep apnea)  (primary encounter diagnosis) Replacement Supplies for Positive Airway Pressure Therapy Device: Duration of need: 99 months.  Oral/Nasal Combo Mask 1 every 3 months.  Oral Cushion Combo Mask (Replace) 2 per month.  Nasal Pillows Combo Mask (Replace) 2 per month.  Full Face Mask 1 every 3 months.  Full Face Mask Cushion 1 per month.  Nasal Cushion (Replace) 2 per month.  Nasal Pillows (Replace) 2 per month.  Nasal Interface Mask 1 every 3 months.  Headgear 1 every 6 months.  Chinstrap 1 every 6 months.  Tubing 1 every 3 months.  Filter(s) Disposable 2 per month.  Filter(s) Non-Disposable 1 every 6 months.  Oral Interface 1 every 3 months. [address] [facility] [address] for [employer] (Replace) 1 every 6 months.  Tubing with heating element 1 every 3 months. Perform Mask Fitting per patient preference and comfort - replace as above. Sadaf Espinal MD, FAA; NPI: [id number] Electronically signed. Date:- 05/15/18 Follow-up Instructions Return in about 1 year (around 5/15/2019), or if symptoms worsen or fail to improve. To-Do List   
 05/15/2018 Sleep Center:  SLEEP LAB (PAP TITRATION) Patient Instructions 217 [facility] [place]. 1668 [facility], [address] Tel.  [phone] Fax. 100 [facility] 60 [facility] 200 [facility] Tel.  [phone] Fax. [phone] [address] [address] Tel.  [phone] Fax. [phone] Learning About CPAP for Sleep Apnea What is CPAP? CPAP is a small machine that you use at home every night while you sleep. It increases air pressure in your throat to keep your airway open. When you have sleep apnea, this can help you sleep better so you feel much better. CPAP stands for \"continuous positive airway pressure. \" The CPAP machine will have one of the following: · A mask that covers your nose and mouth · Prongs that fit into your nose · A mask that covers your nose only, the most common type. This type is called NCPAP. The N stands for \"nasal.\" Why is it done? CPAP is usually the best treatment for obstructive sleep apnea. It is the first treatment choice and the most widely used. Your doctor may suggest CPAP if you have: · Moderate to severe sleep apnea. · Sleep apnea and coronary artery disease (CAD) or heart failure. How does it help? · CPAP can help you have more normal sleep, so you feel less sleepy and more alert during the daytime. · CPAP may help keep heart failure or other heart problems from getting worse. · NCPAP may help lower your blood pressure. · If you use CPAP, your bed partner may also sleep better because you are not snoring or restless. What are the side effects? Some people who use CPAP have: · A dry or stuffy nose and a sore throat. · Irritated skin on the face. · Sore eyes. · Bloating. If you have any of these problems, work with your doctor to fix them. Here are some things you can try: · Be sure the mask or nasal prongs fit well. · See if your doctor can adjust the pressure of your CPAP. · If your nose is dry, try a humidifier. · If your nose is runny or stuffy, try decongestant medicine or a steroid nasal spray. If these things do not help, you might try a different type of machine. Some machines have air pressure that adjusts on its own.  Others have air pressures that are different when you breathe in than when you breathe out. This may reduce discomfort caused by too much pressure in your nose. Where can you learn more? Go to [facility] Enter J633 in the search box to learn more about \"Learning About CPAP for Sleep Apnea. \"  
© 8532-7951 Healthwise, Incorporated. Care instructions adapted under license by [facility] (which disclaims liability or warranty for this information). This care instruction is for use with your licensed healthcare professional. If you have questions about a medical condition or this instruction, always ask your healthcare professional. [address] any warranty or liability for your use of this information. Content Version: 4.8.86600; Last Revised: January 11, 2010 PROPER SLEEP HYGIENE What to avoid · Do not have drinks with caffeine, such as coffee or black tea, for 8 hours before bed. · Do not smoke or use other types of tobacco near bedtime. Nicotine is a stimulant and can keep you awake. · Avoid drinking alcohol late in the evening, because it can cause you to wake in the middle of the night. · Do not eat a big meal close to bedtime. If you are hungry, eat a light snack. · Do not drink a lot of water close to bedtime, because the need to urinate may wake you up during the night. · Do not read or watch TV in bed. Use the bed only for sleeping and sexual activity. What to try · Go to bed at the same time every night, and wake up at the same time every morning. Do not take naps during the day. · Keep your bedroom quiet, dark, and cool. · Get regular exercise, but not within 3 to 4 hours of your bedtime. Sebastien Mercado · Sleep on a comfortable pillow and mattress. · If watching the clock makes you anxious, turn it facing away from you so you cannot see the time. · If you worry when you lie down, start a worry book. Well before bedtime, write down your worries, and then set the book and your concerns aside. · Try meditation or other relaxation techniques before you go to bed. · If you cannot fall asleep, get up and go to another room until you feel sleepy. Do something relaxing. Repeat your bedtime routine before you go to bed again. · Make your house quiet and calm about an hour before bedtime. Turn down the lights, turn off the TV, log off the computer, and turn down the volume on music. This can help you relax after a busy day. Drowsy Driving: The [employer] cites drowsiness as a causing factor in more than 938,499 police reported crashes annually, resulting in 76,000 injuries and 1,500 deaths. Other surveys suggest 55% of people polled have driven while drowsy in the past year, 23% had fallen asleep but not crashed, 3% crashed, and 2% had and accident due to drowsy driving. Who is at risk? Young Drivers: One study of drowsy driving accidents states that 55% of the drivers were under 25 years. Of those, 75% were male. Shift Workers and Travelers: People who work overnight or travel across time zones frequently are at higher risk of experiencing Circadian Rhythm Disorders. They are trying to work and function when their body is programed to sleep. Sleep Deprived: Lack of sleep has a serious impact on your ability to pay attention or focus on a task. Consistently getting less than the average of 8 hours your body needs creates partial or cumulative sleep deprivation. Untreated Sleep Disorders: Sleep Apnea, Narcolepsy, R.L.S., and other sleep disorders (untreated) prevent a person from getting enough restful sleep. This leads to excessive daytime sleepiness and increases the risk for drowsy driving accidents by up to 7 times. Medications / Alcohol: Even over the counter medications can cause drowsiness. Medications that impair a drivers attention should have a warning label.  Alcohol naturally makes you sleepy and on its own can cause accidents. Combined with excessive drowsiness its effects are amplified. Signs of Drowsy Driving: * You don't remember driving the last few miles * You may drift out of your nidia * You are unable to focus and your thoughts wander * You may yawn more often than normal 
 * You have difficulty keeping your eyes open / nodding off * Missing traffic signs, speeding, or tailgating Prevention-  
Good sleep hygiene, lifestyle and behavioral choices have the most impact on drowsy driving. There is no substitute for sleep and the average person requires 8 hours nightly. If you find yourself driving drowsy, stop and sleep. Consider the sleep hygiene tips provided during your visit as well. Medication Refill Policy: Refills for all medications require 1 week advance notice. Please have your pharmacy fax a refill request. We are unable to fax, or call in \"controled substance\" medications and you will need to pick these prescriptions up from our office. [employer] Activation Thank you for requesting access to [employer]. Please follow the instructions below to securely access and download your online medical record. [employer] allows you to send messages to your doctor, view your test results, renew your prescriptions, schedule appointments, and more. How Do I Sign Up? 1. In your internet browser, go to [email or web address]. 2. Click on the First Time User? Click Here link in the Sign In box. You will see the New Member Sign Up page. 3. Enter your [employer] Access Code exactly as it appears below. You will not need to use this code after youve completed the sign-up process. If you do not sign up before the expiration date, you must request a new code. [employer] Access Code: [email or web address] Expires: 2018 11:11 AM (This is the date your [employer] access code will ) 4.  Enter the last four digits of your Social Security Number (xxxx) and Date of Birth (mm/dd/yyyy) as indicated and click Submit. You will be taken to the next sign-up page. 5. Create a MyChart ID. This will be your [employer] login ID and cannot be changed, so think of one that is secure and easy to remember. 6. Create a VB Ragst password. You can change your password at any time. 7. Enter your Password Reset Question and Answer. This can be used at a later time if you forget your password. 8. Enter your e-mail address. You will receive e-mail notification when new information is available in [address] 9. Click Sign Up. You can now view and download portions of your medical record. 10. Click the Download Summary menu link to download a portable copy of your medical information. Additional Information If you have questions, please call [phone]. Remember, [employer] is NOT to be used for urgent needs. For medical emergencies, dial 911. Introducing [facility]! Oma Isaacs introduces [employer] patient portal. Now you can access parts of your medical record, email your doctor's office, and request medication refills online. 1. In your internet browser, go to [email or web address] 2. Click on the First Time User? Click Here link in the Sign In box. You will see the New Member Sign Up page. 3. Enter your [employer] Access Code exactly as it appears below. You will not need to use this code after youve completed the sign-up process. If you do not sign up before the expiration date, you must request a new code. · [employer] Access Code: [email or web address] Expires: 8/13/2018 11:11 AM 
 
4. Enter the last four digits of your Social Security Number (xxxx) and Date of Birth (mm/dd/yyyy) as indicated and click Submit. You will be taken to the next sign-up page. 5. Create a MyChart ID. This will be your VB [employer] login ID and cannot be changed, so think of one that is secure and easy to remember. 6. Create a Linkage password. You can change your password at any time. 7. Enter your Password Reset Question and Answer. This can be used at a later time if you forget your password. 8. Enter your e-mail address. You will receive e-mail notification when new information is available in [address] 9. Click Sign Up. You can now view and download portions of your medical record. 10. Click the Download Summary menu link to download a portable copy of your medical information. If you have questions, please visit the Frequently Asked Questions section of the Linkage website. Remember, Linkage is NOT to be used for urgent needs. For medical emergencies, dial 911. Now available from your iPhone and Android! Please provide this summary of care documentation to your next provider. Your primary care clinician is listed as Murlean Blizzard. If you have any questions after today's visit, please call [phone].

## 2018-06-07 ENCOUNTER — HOSPITAL ENCOUNTER (OUTPATIENT)
Dept: SLEEP MEDICINE | Age: 58
Discharge: HOME | End: 2018-06-07
Payer: COMMERCIAL

## 2018-06-07 DIAGNOSIS — G47.33 OBSTRUCTIVE SLEEP APNEA: ICD-10-CM

## 2018-06-07 PROCEDURE — 95811 POLYSOM 6/>YRS CPAP 4/> PARM: CPT | Performed by: INTERNAL MEDICINE

## 2018-06-08 ENCOUNTER — TELEPHONE (OUTPATIENT)
Dept: SLEEP MEDICINE | Age: 58
End: 2018-06-08

## 2018-06-08 VITALS
HEART RATE: 82 BPM | OXYGEN SATURATION: 99 % | SYSTOLIC BLOOD PRESSURE: 123 MMHG | DIASTOLIC BLOOD PRESSURE: 84 MMHG | BODY MASS INDEX: 40.4 KG/M2 | WEIGHT: 214 LBS | TEMPERATURE: 97.9 F | HEIGHT: 61 IN

## 2018-06-08 DIAGNOSIS — G47.33 OBSTRUCTIVE SLEEP APNEA: Primary | ICD-10-CM

## 2018-06-11 NOTE — TELEPHONE ENCOUNTER
Titration study interpreted. * Device pressure change to CPAP  7 cmH2O by lead technologist either remotely or at PAP clinic (notify patient).     * PAP card download in 4 weeks.

## 2018-06-12 NOTE — TELEPHONE ENCOUNTER
I discussed pressure change with patient and she will call us back later today to schedule a time for a CPAP clinic.

## 2018-06-14 ENCOUNTER — OFFICE VISIT (OUTPATIENT)
Dept: SLEEP MEDICINE | Age: 58
End: 2018-06-14

## 2018-06-14 DIAGNOSIS — G47.33 OBSTRUCTIVE SLEEP APNEA (ADULT) (PEDIATRIC): Primary | ICD-10-CM

## 2018-06-14 NOTE — PROGRESS NOTES
Patient in office for pressure change. Pressure reduced from 8 cm to 7 cm. This was done manually. Patient to return in 4 weeks for download.

## 2018-11-13 ENCOUNTER — HOSPITAL ENCOUNTER (OUTPATIENT)
Dept: MAMMOGRAPHY | Age: 58
Discharge: HOME | End: 2018-11-13
Attending: INTERNAL MEDICINE
Payer: COMMERCIAL

## 2018-11-13 DIAGNOSIS — Z12.39 SCREENING BREAST EXAMINATION: ICD-10-CM

## 2018-11-13 PROCEDURE — 77067 SCR MAMMO BI INCL CAD: CPT

## 2019-10-04 ENCOUNTER — HOSPITAL ENCOUNTER (EMERGENCY)
Age: 59
Discharge: HOME | End: 2019-10-04
Attending: EMERGENCY MEDICINE
Payer: COMMERCIAL

## 2019-10-04 ENCOUNTER — APPOINTMENT (OUTPATIENT)
Dept: GENERAL RADIOLOGY | Age: 59
End: 2019-10-04
Attending: NURSE PRACTITIONER
Payer: COMMERCIAL

## 2019-10-04 VITALS
TEMPERATURE: 98.3 F | OXYGEN SATURATION: 99 % | RESPIRATION RATE: 16 BRPM | SYSTOLIC BLOOD PRESSURE: 139 MMHG | DIASTOLIC BLOOD PRESSURE: 102 MMHG | HEART RATE: 90 BPM

## 2019-10-04 DIAGNOSIS — G89.4 CHRONIC PAIN SYNDROME: Primary | ICD-10-CM

## 2019-10-04 DIAGNOSIS — M19.90 OSTEOARTHRITIS, UNSPECIFIED OSTEOARTHRITIS TYPE, UNSPECIFIED SITE: ICD-10-CM

## 2019-10-04 PROCEDURE — 74011250636 HC RX REV CODE- 250/636: Performed by: NURSE PRACTITIONER

## 2019-10-04 PROCEDURE — 72170 X-RAY EXAM OF PELVIS: CPT

## 2019-10-04 PROCEDURE — 96372 THER/PROPH/DIAG INJ SC/IM: CPT

## 2019-10-04 PROCEDURE — 99283 EMERGENCY DEPT VISIT LOW MDM: CPT

## 2019-10-04 RX ORDER — KETOROLAC TROMETHAMINE 30 MG/ML
30 INJECTION, SOLUTION INTRAMUSCULAR; INTRAVENOUS
Status: COMPLETED | OUTPATIENT
Start: 2019-10-04 | End: 2019-10-04

## 2019-10-04 RX ADMIN — KETOROLAC TROMETHAMINE 30 MG: 30 INJECTION, SOLUTION INTRAMUSCULAR at 20:29

## 2019-10-04 NOTE — ED TRIAGE NOTES
Pt c/o pelvic pain since Monday with difficulty walking r/t pain. Denies urinary s/sxs. Taking Toradol.

## 2019-10-04 NOTE — ED PROVIDER NOTES
64 y.o. female with past medical history significant for DDD, Arthritis who presents from home via wheelchair with chief complaint of pelvic pain. Pt states for the past 4 days she has had worsening, Sharp throbbing pelvic pain. Pt states she has had similar pain in the past, that would subside after a couple days. She has been seen by her PCP who ordered a x-ray which showed Osteoarthritis in her pelvic region x3 months ago. Pt states her doctor did not recommend follow up with ortho or physical therapy afterwards. Pt further notes she has been unable to walk or cross her legs secondary to the pain. She was taking Motrin for the pain, but now is taking a different medication prescribed by her PCP, which she took today. She denies any numbness or tingling in her legs. Pt specifically denies any shortness of breath, chest pain, abdominal pain, hematuria, dysuria, or urinary frequency. There are no other acute medical concerns at this time. PMHx: Significant for Affective disorder, DDD, GERD, Arthritis, Anxiety, depression, Bipolar, DM, JOSE RAFAEL   Social Hx: negative tobacco use(former), negative EtOH use, negative Illicit Drug use    PCP: Isabela Chou MD    Note written by Lamberto Covarrubias, as dictated by Suhali Meier NP 7:47 PM    The history is provided by the patient. No  was used. Past Medical History:   Diagnosis Date    Anxiety     Arthritis     Bipolar 1 disorder ([facility] [address]     Degenerative disk disease     Depression     Diabetes ([facility] [address]     \"border line\"    GERD (gastroesophageal reflux disease)     Mood disorder (HCC)     affective    JOSE RAFAEL (obstructive sleep apnea) 06/04/2012    AHI: 8.5 per hour of sleep       No past surgical history on file.       Family History:   Problem Relation Age of Onset    Breast Cancer Paternal Aunt     Breast Cancer Cousin 72        paunt       Social History     Socioeconomic History    Marital status:      Spouse name: Not on file    Number of children: Not on file    Years of education: Not on file    Highest education level: Not on file   Occupational History    Not on file   Social Needs    Financial resource strain: Not on file    Food insecurity:     Worry: Not on file     Inability: Not on file    Transportation needs:     Medical: Not on file     Non-medical: Not on file   Tobacco Use    Smoking status: Former Smoker    Smokeless tobacco: Never Used   Substance and Sexual Activity    Alcohol use: Yes     Alcohol/week: 0.0 standard drinks     Comment: rarely    Drug use: No    Sexual activity: Not on file   Lifestyle    Physical activity:     Days per week: Not on file     Minutes per session: Not on file    Stress: Not on file   Relationships    Social connections:     Talks on phone: Not on file     Gets together: Not on file     Attends Orthodoxy service: Not on file     Active member of club or organization: Not on file     Attends meetings of clubs or organizations: Not on file     Relationship status: Not on file    Intimate partner violence:     Fear of current or ex partner: Not on file     Emotionally abused: Not on file     Physically abused: Not on file     Forced sexual activity: Not on file   Other Topics Concern    Not on file   Social History Narrative    Not on file         ALLERGIES: Pcn [penicillins] and Sulfa (sulfonamide antibiotics)    Review of Systems   Constitutional: Negative for fever. HENT: Negative for sore throat. Eyes: Negative for visual disturbance. Respiratory: Negative for shortness of breath. Cardiovascular: Negative for chest pain. Gastrointestinal: Negative for abdominal pain. Endocrine: Negative for polyuria. Genitourinary: Positive for pelvic pain. Negative for dysuria, frequency and hematuria. Musculoskeletal: Negative for arthralgias. Neurological: Negative for dizziness. Psychiatric/Behavioral: Negative for confusion.        Vitals:    10/04/19 1801 SpO2: 97%            Physical Exam   Constitutional: She is oriented to person, place, and time. She appears well-developed and well-nourished. No distress. In a wheel chair, Unable to adequately examine    HENT:   Head: Normocephalic and atraumatic. Right Ear: External ear normal.   Left Ear: External ear normal.   Nose: Nose normal.   Eyes: Pupils are equal, round, and reactive to light. Conjunctivae and EOM are normal. No scleral icterus. Neck: Normal range of motion. Neck supple. Cardiovascular: Normal rate, regular rhythm, normal heart sounds and intact distal pulses. Pulmonary/Chest: Effort normal and breath sounds normal. She has no wheezes. She exhibits no tenderness. Abdominal: Normal appearance and normal aorta. Musculoskeletal: She exhibits no edema or deformity. Right hip: She exhibits decreased range of motion and decreased strength. Left hip: She exhibits decreased range of motion and decreased strength. Lymphadenopathy:     She has no cervical adenopathy. Neurological: She is alert and oriented to person, place, and time. No cranial nerve deficit or sensory deficit. She exhibits normal muscle tone. Coordination and gait abnormal.   Skin: Skin is warm and dry. Capillary refill takes less than 2 seconds. No rash noted. She is not diaphoretic. Psychiatric: Judgment and thought content normal. Her affect is angry. She is agitated. Nursing note and vitals reviewed. Note written by Lamberto Hernández, as dictated by Aravind Mauricio NP 7:47 PM     MDM  Number of Diagnoses or Management Options  Chronic pain syndrome: established and worsening  Osteoarthritis, unspecified osteoarthritis type, unspecified site: established and worsening  Diagnosis management comments:     Acute on chronic pelvic pain secondary to OA. Radiograph with degenerative changes to the pubic symphysis and SI joint. Responded moderately well to NSAID.  Recommend following up with orthopedics.        Amount and/or Complexity of Data Reviewed  Tests in the radiology section of CPT®: ordered and reviewed  Review and summarize past medical records: yes  Discuss the patient with other providers: yes (Randal Juarez)  Independent visualization of images, tracings, or specimens: yes           Procedures

## 2019-10-05 NOTE — ED NOTES
MD reviewed discharge instructions and options with patient and patient verbalized understanding. RN reviewed discharge instructions using teachback method. Pt ambulated to exit without difficulty and in no signs of acute distress escorted by her daughter, and she  will drive home. No complaints or needs expressed at this time. Patient was counseled on medications prescribed at discharge. VSS, verbalized relief from most intense pain. Patient to call Dr. Abby Dawkins in the morning for appointment.

## 2019-10-05 NOTE — DISCHARGE INSTRUCTIONS
Patient Education        Osteoarthritis: Care Instructions  Your Care Instructions    Arthritis is a common health problem in which the joints are inflamed. There are several kinds of arthritis. Osteoarthritis is caused by a breakdown of cartilage, the hard, thick tissue that cushions the joints. It causes pain, stiffness, and swelling, often in the spine, fingers, hips, and knees. Osteoarthritis can happen at any age, but it is most common in older people. Osteoarthritis never goes away completely, but it can be controlled. Medicine and home treatment can reduce the pain and prevent the arthritis from getting worse. Follow-up care is a key part of your treatment and safety. Be sure to make and go to all appointments, and call your doctor if you are having problems. It's also a good idea to know your test results and keep a list of the medicines you take. How can you care for yourself at home? · Take a warm shower or bath in the morning to relieve stiffness. Avoid sitting still afterwards. · If the joint is not swollen, use moist heat, like a warm, damp towel, for 20 to 30 minutes, 2 or 3 times a day. Do not use heat on a swollen joint. · If the joint is swollen, use ice or cold packs for 10 to 20 minutes, once an hour. Cold will help relieve pain and reduce inflammation. Put a thin cloth between the ice and your skin. · To prevent stiffness, gently move the joint through its full range of motion several times a day. · If the joint hurts, avoid activities that put a strain on it for a few days. Take rest breaks throughout the day. · Get regular exercise. Walking, swimming, yoga, biking, tori chi, and water aerobics are good exercises that are gentle on the joints. · Reach and stay at a healthy weight. If you need to lose or maintain weight, regular exercise and a healthy diet will help. Extra weight can strain the joints, especially the knees and hips, and make the pain worse.  Losing even a few pounds may help.  · Take pain medicines exactly as directed. ? If the doctor gave you a prescription medicine for pain, take it as prescribed. ? If you are not taking a prescription pain medicine, ask your doctor if you can take an over-the-counter medicine. When should you call for help? Call your doctor now or seek immediate medical care if:    · The pain is so bad that you cannot use the joint.     · You have sudden back pain with weakness in your legs or loss of bowel or bladder control.     · Your stools are black and tarlike or have streaks of blood.     · You have severe pain and swelling in more than one joint.    Watch closely for changes in your health, and be sure to contact your doctor if:    · You have side effects from the medicines, like belly pain, ongoing heartburn, or nausea.     · Joint pain continues for more than 6 weeks, and home treatment is not helping. Where can you learn more? Go to http://dianne-zaida.info/. Enter U436 in the search box to learn more about \"Osteoarthritis: Care Instructions. \"  Current as of: April 1, 2019  Content Version: 12.2  © 3983-1246 [employer]. Care instructions adapted under license by [employer] (which disclaims liability or warranty for this information). If you have questions about a medical condition or this instruction, always ask your healthcare professional. [address] any warranty or liability for your use of this information.

## 2019-11-20 ENCOUNTER — HOSPITAL ENCOUNTER (OUTPATIENT)
Dept: MAMMOGRAPHY | Age: 59
Discharge: HOME | End: 2019-11-20
Attending: INTERNAL MEDICINE
Payer: COMMERCIAL

## 2019-11-20 DIAGNOSIS — Z12.31 VISIT FOR SCREENING MAMMOGRAM: ICD-10-CM

## 2019-11-20 PROCEDURE — 77067 SCR MAMMO BI INCL CAD: CPT

## 2020-10-26 ENCOUNTER — TRANSCRIBE ORDER (OUTPATIENT)
Dept: SCHEDULING | Age: 60
End: 2020-10-26

## 2020-10-26 DIAGNOSIS — Z12.31 VISIT FOR SCREENING MAMMOGRAM: Primary | ICD-10-CM

## 2020-12-22 ENCOUNTER — HOSPITAL ENCOUNTER (OUTPATIENT)
Dept: MAMMOGRAPHY | Age: 60
Discharge: HOME | End: 2020-12-22
Attending: INTERNAL MEDICINE
Payer: COMMERCIAL

## 2020-12-22 DIAGNOSIS — Z12.31 VISIT FOR SCREENING MAMMOGRAM: ICD-10-CM

## 2020-12-22 PROCEDURE — 77067 SCR MAMMO BI INCL CAD: CPT

## 2021-01-27 ENCOUNTER — DOCUMENTATION ONLY (OUTPATIENT)
Dept: SLEEP MEDICINE | Age: 61
End: 2021-01-27

## 2021-01-27 ENCOUNTER — VIRTUAL VISIT (OUTPATIENT)
Dept: SLEEP MEDICINE | Age: 61
End: 2021-01-27
Payer: COMMERCIAL

## 2021-01-27 DIAGNOSIS — G47.33 OBSTRUCTIVE SLEEP APNEA: Primary | ICD-10-CM

## 2021-01-27 DIAGNOSIS — F41.9 ANXIETY AND DEPRESSION: ICD-10-CM

## 2021-01-27 DIAGNOSIS — F32.A ANXIETY AND DEPRESSION: ICD-10-CM

## 2021-01-27 DIAGNOSIS — I10 ESSENTIAL HYPERTENSION: ICD-10-CM

## 2021-01-27 PROCEDURE — 99213 OFFICE O/P EST LOW 20 MIN: CPT | Performed by: INTERNAL MEDICINE

## 2021-01-27 NOTE — PATIENT INSTRUCTIONS
[address] [place] [address] [facility], [address] Tel.  [phone] Fax. 100 [facility] [address] [place], [address] [facility] Tel.  [phone] Fax. [phone] [address] [facility] Brennan Patel Tel.  [phone] Fax. [phone] Sleep Apnea: After Your Visit Your Care Instructions Sleep apnea occurs when you frequently stop breathing for 10 seconds or longer during sleep. It can be mild to severe, based on the number of times per hour that you stop breathing or have slowed breathing. Blocked or narrowed airways in your nose, mouth, or throat can cause sleep apnea. Your airway can become blocked when your throat muscles and tongue relax during sleep. Sleep apnea is common, occurring in 1 out of 20 individuals. Individuals having any of the following characteristics should be evaluated and treated right away due to high risk and detrimental consequences from untreated sleep apnea: 
1. Obesity 2. Congestive Heart failure 3. Atrial Fibrillation 4. Uncontrolled Hypertension 5. Type II Diabetes 6. Night-time Arrhythmias 7. Stroke 8. Pulmonary Hypertension 9. High-risk Driving Populations (pilots, truck drivers, etc.) 10. Patients Considering Weight-loss Surgery How do you know you have sleep apnea? You probably have sleep apnea if you answer 'yes' to 3 or more of the following questions: S - Have you been told that you Snore? T - Are you often Tired during the day? O - Has anyone Observed you stop breathing while sleeping? P- Do you have (or are being treated for) high blood Pressure? B - Are you obese (Body Mass Index > 35)? A - Is your Age 48years old or older? N - Is your Neck size greater than 16 inches? G - Are you male Gender? A sleep physician can prescribe a breathing device that prevents tissues in the throat from blocking your airway. Or your doctor may recommend using a dental device (oral breathing device) to help keep your airway open. In some cases, surgery may be needed to remove enlarged tissues in the throat. Follow-up care is a key part of your treatment and safety. Be sure to make and go to all appointments, and call your doctor if you are having problems. It's also a good idea to know your test results and keep a list of the medicines you take. How can you care for yourself at home? · Lose weight, if needed. It may reduce the number of times you stop breathing or have slowed breathing. · Go to bed at the same time every night. · Sleep on your side. It may stop mild apnea. If you tend to roll onto your back, sew a pocket in the back of your pajama top. Put a tennis ball into the pocket, and stitch the pocket shut. This will help keep you from sleeping on your back. · Avoid alcohol and medicines such as sleeping pills and sedatives before bed. · Do not smoke. Smoking can make sleep apnea worse. If you need help quitting, talk to your doctor about stop-smoking programs and medicines. These can increase your chances of quitting for good. · Prop up the head of your bed 4 to 6 inches by putting bricks under the legs of the bed. · Treat breathing problems, such as a stuffy nose, caused by a cold or allergies. · Use a continuous positive airway pressure (CPAP) breathing machine if lifestyle changes do not help your apnea and your doctor recommends it. The machine keeps your airway from closing when you sleep. · If CPAP does not help you, ask your doctor whether you should try other breathing machines. A bilevel positive airway pressure machine has two types of air pressureâone for breathing in and one for breathing out. Another device raises or lowers air pressure as needed while you breathe. · If your nose feels dry or bleeds when using one of these machines, talk with your doctor about increasing moisture in the air. A humidifier may help. 
· If your nose is runny or stuffy from using a breathing machine, talk with your doctor about using decongestants or a corticosteroid nasal spray. 
When should you call for help? 
Watch closely for changes in your health, and be sure to contact your doctor if: 
· You still have sleep apnea even though you have made lifestyle changes. 
· You are thinking of trying a device such as CPAP. 
· You are having problems using a CPAP or similar machine. 
              Where can you learn more?  
Go to http://www.sambaash.net/[employer]. 
Enter J936 in the search box to learn more about \"Sleep Apnea: After Your Visit.\"  
© 1910-3311 [employer], Incorporated. Care instructions adapted under license by [employer] [facility] [employer] (which disclaims liability or warranty for this information). This care instruction is for use with your licensed healthcare professional. If you have questions about a medical condition or this instruction, always ask your healthcare professional. [employer] disclaims any warranty or liability for your use of this information. 
 
 
PROPER SLEEP HYGIENE 
 
What to avoid 
· Do not have drinks with caffeine, such as coffee or black tea, for 8 hours before bed. 
· Do not smoke or use other types of tobacco near bedtime. Nicotine is a stimulant and can keep you awake. 
· Avoid drinking alcohol late in the evening, because it can cause you to wake in the middle of the night. 
· Do not eat a big meal close to bedtime. If you are hungry, eat a light snack. 
· Do not drink a lot of water close to bedtime, because the need to urinate may wake you up during the night. 
· Do not read or watch TV in bed. Use the bed only for sleeping and sexual activity. 
What to try 
 · Go to bed at the same time every night, and wake up at the same time every morning. Do not take naps during the day. · Keep your bedroom quiet, dark, and cool. · Get regular exercise, but not within 3 to 4 hours of your bedtime. Sammy Arteaga · Sleep on a comfortable pillow and mattress. · If watching the clock makes you anxious, turn it facing away from you so you cannot see the time. · If you worry when you lie down, start a worry book. Well before bedtime, write down your worries, and then set the book and your concerns aside. · Try meditation or other relaxation techniques before you go to bed. · If you cannot fall asleep, get up and go to another room until you feel sleepy. Do something relaxing. Repeat your bedtime routine before you go to bed again. · Make your house quiet and calm about an hour before bedtime. Turn down the lights, turn off the TV, log off the computer, and turn down the volume on music. This can help you relax after a busy day. Drowsy Driving The [place] [address] cites drowsiness as a causing factor in more than 957,208 police reported crashes annually, resulting in 76,000 injuries and 1,500 deaths. Other surveys suggest 55% of people polled have driven while drowsy in the past year, 23% had fallen asleep but not crashed, 3% crashed, and 2% had and accident due to drowsy driving. Who is at risk? Young Drivers: One study of drowsy driving accidents states that 55% of the drivers were under 25 years. Of those, 75% were male. Shift Workers and Travelers: People who work overnight or travel across time zones frequently are at higher risk of experiencing Circadian Rhythm Disorders. They are trying to work and function when their body is programed to sleep. Sleep Deprived: Lack of sleep has a serious impact on your ability to pay attention or focus on a task. Consistently getting less than the average of 8 hours your body needs creates partial or cumulative sleep deprivation. Untreated Sleep Disorders: Sleep Apnea, Narcolepsy, R.L.S., and other sleep disorders (untreated) prevent a person from getting enough restful sleep. This leads to excessive daytime sleepiness and increases the risk for drowsy driving accidents by up to 7 times. Medications / Alcohol: Even over the counter medications can cause drowsiness. Medications that impair a drivers attention should have a warning label. Alcohol naturally makes you sleepy and on its own can cause accidents. Combined with excessive drowsiness its effects are amplified. Signs of Drowsy Driving: * You don't remember driving the last few miles * You may drift out of your nidia * You are unable to focus and your thoughts wander * You may yawn more often than normal 
 * You have difficulty keeping your eyes open / nodding off * Missing traffic signs, speeding, or tailgating Prevention-  
Good sleep hygiene, lifestyle and behavioral choices have the most impact on drowsy driving. There is no substitute for sleep and the average person requires 8 hours nightly. If you find yourself driving drowsy, stop and sleep. Consider the sleep hygiene tips provided during your visit as well. Medication Refill Policy: Refills for all medications require 1 week advance notice. Please have your pharmacy fax a refill request. We are unable to fax, or call in \"controled substance\" medications and you will need to pick these prescriptions up from our office. MyChart Activation Thank you for requesting access to [employer]. Please follow the instructions below to securely access and download your online medical record. [employer] allows you to send messages to your doctor, view your test results, renew your prescriptions, schedule appointments, and more. How Do I Sign Up? 1. In your internet browser, go to [email or web address]. 2. Click on the First Time User? Click Here link in the Sign In box. You will see the New Member Sign Up page. 3. Enter your [employer] Access Code exactly as it appears below. You will not need to use this code after youve completed the sign-up process. If you do not sign up before the expiration date, you must request a new code. [employer] Access Code: [email or web address] Expires: 3/13/2021  8:38 AM (This is the date your [employer] access code will ) 4. Enter the last four digits of your Social Security Number (xxxx) and Date of Birth (mm/dd/yyyy) as indicated and click Submit. You will be taken to the next sign-up page. 5. Create a [employer] ID. This will be your [employer] login ID and cannot be changed, so think of one that is secure and easy to remember. 6. Create a [employer] password. You can change your password at any time. 7. Enter your Password Reset Question and Answer. This can be used at a later time if you forget your password. 8. Enter your e-mail address. You will receive e-mail notification when new information is available in [address]. 9. Click Sign Up. You can now view and download portions of your medical record. 10. Click the Download Summary menu link to download a portable copy of your medical information. Additional Information If you have questions, please call [phone]. Remember, [employer] is NOT to be used for urgent needs. For medical emergencies, dial 911.

## 2021-01-27 NOTE — PROGRESS NOTES
[address] [facility] [place], [address]  Tel.  [phone]  Fax. 100 [facility] 60  [place], [address] [facility]  Tel.  [phone]  Fax. [phone] 9[address] [facility] Brennan Patel   Tel.  [phone]  Fax. [phone]         Subjective:    Aislinn Brown is a 64 y.o. female who was seen by synchronous (real-time) audio-video technology on 1/27/2021. Consent:  She is aware that this patient-initiated Telehealth encounter is a billable service, with coverage as determined by her insurance carrier. She is aware that she may receive a bill and has provided verbal consent to proceed: Yes    I was at home while conducting this encounter. Patient verified with 's license. She complains of snoring associated with feels tired and sleepy without CPAP therapy. Symptoms began several years ago, she was diagnosed with JOSE RAFAEL in 2012 (AHI: 8 per hour) and prescribed a PAP device which she has used sporadically since that time (device download not available). She usually can fall asleep in 10-15 minutes. Family or house members note snoring. She denies completely or partially paralyzed while falling asleep or waking up. Aislinn Brown does wake up frequently at night. She is not bothered by waking up too early and left unable to get back to sleep. She actually sleeps about 6 hours at night and wakes up about 1-3 times during the night. She does not work shifts.       [place] Sleepiness Score: 9   and Modified F.O.S.Q. Score Total / 2: 9       Allergies   Allergen Reactions    Pcn [Penicillins] Hives    Sulfa (Sulfonamide Antibiotics) Hives         Current Outpatient Medications:     acyclovir (ZOVIRAX) 400 mg tablet, acyclovir 400 mg tablet, Disp: , Rfl:     atorvastatin (LIPITOR) 20 mg tablet, atorvastatin 20 mg tablet, Disp: , Rfl:     ibuprofen (MOTRIN) 600 mg tablet, take 1 tablet by mouth every 6 to 8 hours if needed for pain, Disp: , Rfl:     hydroCHLOROthiazide (HYDRODIURIL) 25 mg tablet, Take 25 mg by mouth daily. , Disp: , Rfl:     omeprazole (PRILOSEC) 20 mg capsule, Take 20 mg by mouth daily. , Disp: , Rfl:     multivitamin (ONE A DAY) tablet, Take 1 Tab by mouth daily. , Disp: , Rfl:     alprazolam (XANAX) 0.5 mg tablet, Take  by mouth nightly as needed. , Disp: , Rfl:     OXYCODONE HCL (OXYCODONE PO), Take  by mouth. 1 tab every 6 hrs/ , Disp: , Rfl:     ammonium lactate (LAC-HYDRIN) 12 % lotion, ammonium lactate 12 % lotion, Disp: , Rfl:     atorvastatin (LIPITOR) 20 mg tablet, take 1 tablet by mouth once daily, Disp: , Rfl:     clindamycin (CLEOCIN) 150 mg capsule, take 1 capsule by mouth every 6 hours for 7 days, Disp: , Rfl:     clindamycin (CLEOCIN) 300 mg capsule, clindamycin HCl 300 mg capsule, Disp: , Rfl:     FLUoxetine (PROZAC) 20 mg tablet, fluoxetine 20 mg tablet, Disp: , Rfl:     FLUoxetine (PROZAC) 40 mg capsule, fluoxetine 40 mg capsule, Disp: , Rfl:     erythromycin base (E-MYCIN) 250 mg tablet, erythromycin 250 mg tablet, Disp: , Rfl:     calcium 500 mg tab, Take 1 Tab by mouth daily. , Disp: , Rfl:     DICLOFENAC SODIUM PO, Take 1 Tab by mouth daily. , Disp: , Rfl:     risperidone (RISPERDAL) 4 mg tablet, Take  by mouth daily. , Disp: , Rfl:      She  has a past medical history of Anxiety, Arthritis, Bipolar 1 disorder ([facility] [address] Degenerative disk disease, Depression, Diabetes ([facility] [address] GERD (gastroesophageal reflux disease), Mood disorder ([facility] [address] and JOSE RAFAEL (obstructive sleep apnea) (06/04/2012). She  has no past surgical history on file. She family history includes Breast Cancer in her paternal aunt; Breast Cancer (age of onset: 72) in her cousin. She  reports that she has quit smoking. She has never used smokeless tobacco. She reports current alcohol use. She reports that she does not use drugs.      Review of Systems:  Constitutional:  No significant weight loss or weight gain  Eyes:  No blurred vision  CVS:  No significant chest pain  Pulm:  No significant shortness of breath  GI:  No significant nausea or vomiting  :  No significant nocturia  Musculoskeletal:  No significant joint pain at night  Skin:  No significant rashes  Neuro:  No significant dizziness   Psych:  No active mood issues    Sleep Review of Systems: notable for no difficulty falling asleep; frequent awakenings at night;  regular dreaming noted; no nightmares ; no early morning headaches; no memory problems; no concentration issues; no history of any automobile or occupational accidents due to daytime drowsiness. Objective:     Patient-Reported Vitals 1/27/2021   Patient-Reported Weight 212       Physical Exam completed by visual and auditory observation of patient with verbal input from patient. General:   Alert, oriented, not in acute distress   Eyes:  Anicteric Sclerae; no obvious strabismus   Nose:  No obvious nasal septum deviation    Neck:   Midline trachea, no visible mass   Chest/Lungs:  Respiratory effort normal, no visualized signs of difficulty breathing or respiratory distress   CVS:  No JVD   Extremities:  No obvious rashes noted on face, neck, or hands   Neuro:  No facial asymmetry, no focal deficits; no obvious tremor    Psych:  Normal affect,  normal countenance       Assessment:       ICD-10-CM ICD-9-CM    1. Obstructive sleep apnea  G47.33 327.23 SLEEP STUDY UNATTENDED, 4 CHANNEL   2. Essential hypertension  I10 401.9    3. Anxiety and depression  F41.9 300.00     F32.9 311    4. BMI 37.0-37.9, adult  Z68.37 V85.37          Plan:        Sleep testing was ordered for initial evaluation.  She was provided information on sleep apnea including coresponding risk factors and the importance of proper treatment.  Treatment options if indicated were reviewed today. Patient agrees to a trial of PAP therapy (new device) if indicated.    Counseling was provided regarding proper sleep hygiene, appropriate sleep schedule, need for sleep environment safety and safe driving.  Recommended a dedicated weight loss program through appropriate diet and exercise regimen as significant weight reduction has been shown to reduce severity of obstructive sleep apnea. Patient's phone number [phone] (home)  was reviewed and confirmed for accuracy. She gives permission for messages regarding results and appointments to be left at that number. Pursuant to the emergency declaration under the [address] [place] [address], Critical access hospital waiver authority and the [employer] Act, this Virtual Visit was conducted, with patient's consent, to reduce the patient's risk of exposure to COVID-19 and provide continuity of care for an established patient. Services were provided through a video synchronous discussion virtually to substitute for in-person clinic visit. Bita Colunga MD, FAASM  Electronically signed.  01/27/21

## 2021-02-04 ENCOUNTER — OFFICE VISIT (OUTPATIENT)
Dept: SLEEP MEDICINE | Age: 61
End: 2021-02-04

## 2021-02-04 DIAGNOSIS — G47.33 OBSTRUCTIVE SLEEP APNEA: Primary | ICD-10-CM

## 2021-02-04 NOTE — PROGRESS NOTES
S>Aislinn Brown is a 64 y.o. female seen today to receive a home sleep testing unit (HST). · Patient was educated on proper hookup and operation of the HST via detailed instruction sheet (per COVID-19 precautions)  · Instruction forms with after hours contact and documentation were signed. O>    There were no vitals taken for this visit. A>  No diagnosis found. P>  · General information regarding operations and maintenance of the device was provided. · Follow-up appointment was made to return the HST. She will be contacted once the results have been reviewed. · She was asked to contact our office for any problems regarding her home sleep test study.

## 2021-02-05 ENCOUNTER — HOSPITAL ENCOUNTER (OUTPATIENT)
Dept: SLEEP MEDICINE | Age: 61
Discharge: HOME | End: 2021-02-05
Payer: COMMERCIAL

## 2021-02-05 PROCEDURE — 95806 SLEEP STUDY UNATT&RESP EFFT: CPT | Performed by: INTERNAL MEDICINE

## 2021-02-11 ENCOUNTER — TELEPHONE (OUTPATIENT)
Dept: SLEEP MEDICINE | Age: 61
End: 2021-02-11

## 2021-02-11 DIAGNOSIS — G47.33 OBSTRUCTIVE SLEEP APNEA: Primary | ICD-10-CM

## 2021-02-12 NOTE — TELEPHONE ENCOUNTER
Aislinn Brown is to be contacted by lead sleep technologist regarding results of Sleep Testing which was indicative of an average AHI of 10.4 per hour with an SpO2 jerica of 86% and SpO2 of < 88% being 0.7 minutes. An APAP prescription has been written and patient will be contacted by office staff regarding follow-up  in 2-3 months after initiation of therapy. Encounter Diagnosis   Name Primary?  Obstructive sleep apnea Yes       Orders Placed This Encounter    AMB SUPPLY ORDER     Diagnosis: (G47.33) JOSE RAFAEL (obstructive sleep apnea)  (primary encounter diagnosis)     [employer]ironics DreamStation ( Unit - Auto Mode) / Heated Humidifier :    Positive Airway Pressure Therapy: Duration of need: 99 months. Auto - PAP: 4 - 20 cmH2O; Optistart enabled. Ramp Time: 30 Minutes; Flex: 2. Remote monitoring enrollment.  Nasal Cushion (Replace) 2 per month.  Nasal Interface Mask 1 every 3 months.  Headgear 1 every 6 months.  Filter(s) Disposable 2 per month.  Filter(s) Non-Disposable 1 every 6 months. [address] for [employer]alberted Ziggy (Replace) 1 every 6 months.  Tubing with heating element 1 every 3 months. Perform Mask Fitting per patient preference and comfort - replace as above. Ken Sweeney MD, FAASM; NPI: [id number]  Electronically signed. 02/11/21

## 2021-02-16 ENCOUNTER — DOCUMENTATION ONLY (OUTPATIENT)
Dept: SLEEP MEDICINE | Age: 61
End: 2021-02-16

## 2021-03-04 ENCOUNTER — TELEPHONE (OUTPATIENT)
Dept: SLEEP MEDICINE | Age: 61
End: 2021-03-04

## 2021-03-04 NOTE — TELEPHONE ENCOUNTER
Patient called stated she did not get her PAP device   Informed patient to call [employer].    Patient called back:  Patient stated Sharda Laureano is waiting for Authorization from her insurance Co.  Will take 2 to 3 weeks , and will get device soon.

## 2021-05-20 ENCOUNTER — VIRTUAL VISIT (OUTPATIENT)
Dept: SLEEP MEDICINE | Age: 61
End: 2021-05-20
Payer: COMMERCIAL

## 2021-05-20 DIAGNOSIS — I10 ESSENTIAL HYPERTENSION: ICD-10-CM

## 2021-05-20 DIAGNOSIS — G47.33 OBSTRUCTIVE SLEEP APNEA: Primary | ICD-10-CM

## 2021-05-20 DIAGNOSIS — F41.9 ANXIETY AND DEPRESSION: ICD-10-CM

## 2021-05-20 DIAGNOSIS — Z11.52 ENCOUNTER FOR SCREENING FOR COVID-19: ICD-10-CM

## 2021-05-20 DIAGNOSIS — F32.A ANXIETY AND DEPRESSION: ICD-10-CM

## 2021-05-20 PROCEDURE — 99213 OFFICE O/P EST LOW 20 MIN: CPT | Performed by: INTERNAL MEDICINE

## 2021-05-20 NOTE — PROGRESS NOTES
[address] [place], [address]  Tel.  [phone]  Fax. 100 [facility] 60  [place], [address] [facility]  Tel.  [phone]  Fax. [phone] 9[address] [facility] Brennan Patel   Tel.  [phone]  Fax. [phone]       Aislinn Brown (: 1960) is a 63 y.o. female, established patient, seen for positive airway pressure follow-up and evaluation of the following chief complaint(s):   Sleep Problem (1st adherence, send link to: [phone])       Aislinn Brown was last seen by me on 21, prior notes reviewed in detail. Home Sleep Apnea Test (HSAT) performed on 2021 showed an AHI of 10.4/hr with a lowest SpO2 of 86%, duration of SpO2 < 88% 0.7 minutes. ASSESSMENT/PLAN:    ICD-10-CM ICD-9-CM    1. Obstructive sleep apnea  G47.33 327.23 SLEEP LAB (PAP TITRATION)   2. Anxiety and depression  F41.9 300.00     F32.9 311    3. Essential hypertension  I10 401.9    4. BMI 36.0-36.9,adult  Z68.36 V85.36    5. Encounter for screening for COVID-19  Z11.52 V73.89 NOVEL CORONAVIRUS (COVID-19)      NOVEL CORONAVIRUS (COVID-19)       On Respironics :  DreamStation Auto-CPAP. Set up date  03-10-21. Settings:  Min EPAP: 4 cmH2O  Max EPAP: 20 cmH2O      1. Sleep Apnea -   * She is adherent with PAP therapy and PAP continues to benefit patient and remains necessary for control of her sleep apnea. * Patient agrees to return for attended titration due to elevated AHI on download.     Orders Placed This Encounter    NOVEL CORONAVIRUS (COVID-19)     Standing Status:   Future     Number of Occurrences:   1     Standing Expiration Date:   2021     Scheduling Instructions:      1) Due to current limited availability of the COVID-19 PCR test, tests will be prioritized and may not be completed.              2) Order only if the test result will change clinical management or necessary for a return to mission-critical employment decision.   3) Print and instruct patient to adhere to [facility] home isolation program. (Link Above)              4) Set up or refer patient for a monitoring program.              5) Have patient sign up for and leverage MyChart (if not previously done). Order Specific Question:   Status     Answer:   Asymptomatic/Surveillance(e.g. pre-op/pre-procedure/pre-delivery/transfer)     Order Specific Question:   Reason for Test     Answer:   Upcoming elective surgery/procedure/delivery, return to work, or discharge to another facility    SLEEP LAB (PAP TITRATION)     Standing Status:   Future     Standing Expiration Date:   8/20/2021     Order Specific Question:   Reason for Exam     Answer:   JOSE RAFAEL     * Counseling was provided regarding the importance of regular PAP use with emphasis on ensuring sufficient total sleep time, proper sleep hygiene, and safe driving. * Re-enforced proper and regular cleaning for the device. * She was asked to contact our office for any problems regarding PAP therapy. 2. Recommended a dedicated weight loss program through appropriate diet and exercise regimen as significant weight reduction has been shown to reduce severity of obstructive sleep apnea. Follow-up and Dispositions    · Return for follow-up with sleep provider in 1 yr or as needed. SUBJECTIVE/OBJECTIVE:    She  is seen today for follow up on PAP device and reports no problems using the device. The following concerns identified:    Drowsiness no Problems exhaling no   Snoring no Forget to put on no   Mask Comfortable yes Can't fall asleep no   Dry Mouth no Mask falls off no   Air Leaking no Frequent awakenings no       She admits that her sleep has improved on PAP therapy using nasal mask and heated tubing.      Review of device download indicated:    Percent of Days with Usage >= 4 hours 66.7 %  Average Usage (Days Used) 4 hour 47 minutes    Average Device Pressure < =90% of Time  9.0 cmH2O    Average Time in Large Leak Per Day  4 seconds    Average AHI: 5.9 /hr which reflects improved sleep breathing condition. Semmes Sleepiness Score: 7   Modified F.O.S.Q. Score Total / 2: 17.5       Sleep Review of Systems: notable for Negative difficulty falling asleep; Negative awakenings at night; Negative  early morning headaches; Negative  memory problems; Negative  concentration issues; Negative  chest pain; Negative  shortness of breath;  Negative rashes or itching; Negative heartburn / belching / flatulence; Negative  significant mood issues; occasional afternoon naps per week. Vitals reported by patient     Patient-Reported Vitals 5/20/2021   Patient-Reported Weight 207 lb      Calculated BMI 36.7 kg/m2    Physical Exam completed by visual and auditory observation of patient with verbal input from patient. General:   Alert, oriented, not in acute distress   Eyes:  Anicteric Sclerae; no obvious strabismus   Nose:  No obvious nasal septum deviation    Neck:   Midline trachea, no visible mass   Chest/Lungs:  Respiratory effort normal, no visualized signs of difficulty breathing or respiratory distress   CVS:  No JVD   Extremities:  No obvious rashes noted on face, neck, or hands   Neuro:  No facial asymmetry, no focal deficits; no obvious tremor    Psych:  Normal affect,  normal countenance     April Stephanie is being evaluated by a Virtual Visit (video visit) encounter to address concerns as mentioned above. A caregiver was present when appropriate. Due to this being a TeleHealth encounter (During [facility]- public health emergency), evaluation of the following organ systems was limited: Vitals/Constitutional/EENT/Resp/CV/GI//MS/Neuro/Skin/Heme-Lymph-Imm.   Pursuant to the emergency declaration under the [facility], 1135 waiver authority and the [employer] Act, this Virtual Visit was conducted with patient's (and/or legal guardian's) consent, to reduce the patient's risk of exposure to COVID-19 and provide necessary medical care. Patient identification was verified at the start of the visit: YES using name and date of birth. Patient's phone number [phone] (home)  was confirmed for accuracy. She gives permission for messages regarding results and appointments to be left at that number. Services were provided through a video synchronous discussion virtually to substitute for in-person clinic visit. I was in the office while conducting this encounter, patient located at their home or alternate location of their choice. An electronic signature was used to authenticate this note. Yecenia Gerber MD, FAASM  Electronically signed.  05/20/21

## 2021-05-20 NOTE — PATIENT INSTRUCTIONS
[address] [facility] [place] [address] [facility], [address] Tel.  [phone] Fax. 100 [facility] 60 [place], [address] [facility] Tel.  [phone] Fax. [phone] [address] [place] Tel.  [phone] Fax. [phone] Learning About CPAP for Sleep Apnea What is CPAP? CPAP is a small machine that you use at home every night while you sleep. It increases air pressure in your throat to keep your airway open. When you have sleep apnea, this can help you sleep better so you feel much better. CPAP stands for \"continuous positive airway pressure. \" The CPAP machine will have one of the following: · A mask that covers your nose and mouth · Prongs that fit into your nose · A mask that covers your nose only, the most common type. This type is called NCPAP. The N stands for \"nasal.\" Why is it done? CPAP is usually the best treatment for obstructive sleep apnea. It is the first treatment choice and the most widely used. Your doctor may suggest CPAP if you have: · Moderate to severe sleep apnea. · Sleep apnea and coronary artery disease (CAD) or heart failure. How does it help? · CPAP can help you have more normal sleep, so you feel less sleepy and more alert during the daytime. · CPAP may help keep heart failure or other heart problems from getting worse. · NCPAP may help lower your blood pressure. · If you use CPAP, your bed partner may also sleep better because you are not snoring or restless. What are the side effects? Some people who use CPAP have: · A dry or stuffy nose and a sore throat. · Irritated skin on the face. · Sore eyes. · Bloating. If you have any of these problems, work with your doctor to fix them. Here are some things you can try: · Be sure the mask or nasal prongs fit well. · See if your doctor can adjust the pressure of your CPAP. · If your nose is dry, try a humidifier.  
· If your nose is runny or stuffy, try decongestant medicine or a steroid nasal spray. If these things do not help, you might try a different type of machine. Some machines have air pressure that adjusts on its own. Others have air pressures that are different when you breathe in than when you breathe out. This may reduce discomfort caused by too much pressure in your nose. Where can you learn more? Go to Zumobi.be Enter T953 in the search box to learn more about \"Learning About CPAP for Sleep Apnea. \"  
© 9955-8193 Healthwise, Incorporated. Care instructions adapted under license by [employer] (which disclaims liability or warranty for this information). This care instruction is for use with your licensed healthcare professional. If you have questions about a medical condition or this instruction, always ask your healthcare professional. [address] any warranty or liability for your use of this information. Content Version: 8.1.00231; Last Revised: January 11, 2010 PROPER SLEEP HYGIENE What to avoid · Do not have drinks with caffeine, such as coffee or black tea, for 8 hours before bed. · Do not smoke or use other types of tobacco near bedtime. Nicotine is a stimulant and can keep you awake. · Avoid drinking alcohol late in the evening, because it can cause you to wake in the middle of the night. · Do not eat a big meal close to bedtime. If you are hungry, eat a light snack. · Do not drink a lot of water close to bedtime, because the need to urinate may wake you up during the night. · Do not read or watch TV in bed. Use the bed only for sleeping and sexual activity. What to try · Go to bed at the same time every night, and wake up at the same time every morning. Do not take naps during the day. · Keep your bedroom quiet, dark, and cool. · Get regular exercise, but not within 3 to 4 hours of your bedtime. [employer] · Sleep on a comfortable pillow and mattress.  
· If watching the clock makes you anxious, turn it facing away from you so you cannot see the time. · If you worry when you lie down, start a worry book. Well before bedtime, write down your worries, and then set the book and your concerns aside. · Try meditation or other relaxation techniques before you go to bed. · If you cannot fall asleep, get up and go to another room until you feel sleepy. Do something relaxing. Repeat your bedtime routine before you go to bed again. · Make your house quiet and calm about an hour before bedtime. Turn down the lights, turn off the TV, log off the computer, and turn down the volume on music. This can help you relax after a busy day. Drowsy Driving: The [employer] cites drowsiness as a causing factor in more than 008,580 police reported crashes annually, resulting in 76,000 injuries and 1,500 deaths. Other surveys suggest 55% of people polled have driven while drowsy in the past year, 23% had fallen asleep but not crashed, 3% crashed, and 2% had and accident due to drowsy driving. Who is at risk? Young Drivers: One study of drowsy driving accidents states that 55% of the drivers were under 25 years. Of those, 75% were male. Shift Workers and Travelers: People who work overnight or travel across time zones frequently are at higher risk of experiencing Circadian Rhythm Disorders. They are trying to work and function when their body is programed to sleep. Sleep Deprived: Lack of sleep has a serious impact on your ability to pay attention or focus on a task. Consistently getting less than the average of 8 hours your body needs creates partial or cumulative sleep deprivation. Untreated Sleep Disorders: Sleep Apnea, Narcolepsy, R.L.S., and other sleep disorders (untreated) prevent a person from getting enough restful sleep. This leads to excessive daytime sleepiness and increases the risk for drowsy driving accidents by up to 7 times.  
Medications / Alcohol: Even over the counter medications can cause drowsiness. Medications that impair a drivers attention should have a warning label. Alcohol naturally makes you sleepy and on its own can cause accidents. Combined with excessive drowsiness its effects are amplified. Signs of Drowsy Driving: * You don't remember driving the last few miles * You may drift out of your nidia * You are unable to focus and your thoughts wander * You may yawn more often than normal 
 * You have difficulty keeping your eyes open / nodding off * Missing traffic signs, speeding, or tailgating Prevention-  
Good sleep hygiene, lifestyle and behavioral choices have the most impact on drowsy driving. There is no substitute for sleep and the average person requires 8 hours nightly. If you find yourself driving drowsy, stop and sleep. Consider the sleep hygiene tips provided during your visit as well. Medication Refill Policy: Refills for all medications require 1 week advance notice. Please have your pharmacy fax a refill request. We are unable to fax, or call in \"controled substance\" medications and you will need to pick these prescriptions up from our office. [email or web address]sm Activation Thank you for requesting access to [email or web address]. Please follow the instructions below to securely access and download your online medical record. [employer] allows you to send messages to your doctor, view your test results, renew your prescriptions, schedule appointments, and more. How Do I Sign Up? 1. In your internet browser, go to [email or web address]. 2. Click on the First Time User? Click Here link in the Sign In box. You will see the New Member Sign Up page. 3. Enter your [employer]sm Access Code exactly as it appears below. You will not need to use this code after youve completed the sign-up process. If you do not sign up before the expiration date, you must request a new code.  
 
Micro[employer]sm Access Code: [email or web address] Expires: 2021  8:23 AM (This is the date your Xeebel access code will ) 4. Enter the last four digits of your Social Security Number (xxxx) and Date of Birth (mm/dd/yyyy) as indicated and click Submit. You will be taken to the next sign-up page. 5. Create a Xeebel ID. This will be your Xeebel login ID and cannot be changed, so think of one that is secure and easy to remember. 6. Create a Xeebel password. You can change your password at any time. 7. Enter your Password Reset Question and Answer. This can be used at a later time if you forget your password. 8. Enter your e-mail address. You will receive e-mail notification when new information is available in [address] 9. Click Sign Up. You can now view and download portions of your medical record. 10. Click the Download Summary menu link to download a portable copy of your medical information. Additional Information If you have questions, please call [phone]. Remember, Xeebel is NOT to be used for urgent needs. For medical emergencies, dial 911.

## 2021-06-09 ENCOUNTER — TRANSCRIBE ORDER (OUTPATIENT)
Dept: REGISTRATION | Age: 61
End: 2021-06-09

## 2021-06-09 ENCOUNTER — HOSPITAL ENCOUNTER (OUTPATIENT)
Dept: PREADMISSION TESTING | Age: 61
Discharge: HOME | End: 2021-06-09
Payer: COMMERCIAL

## 2021-06-09 DIAGNOSIS — Z01.812 PRE-PROCEDURE LAB EXAM: ICD-10-CM

## 2021-06-09 DIAGNOSIS — Z01.812 PRE-PROCEDURE LAB EXAM: Primary | ICD-10-CM

## 2021-06-09 PROCEDURE — U0003 INFECTIOUS AGENT DETECTION BY NUCLEIC ACID (DNA OR RNA); SEVERE ACUTE RESPIRATORY SYNDROME CORONAVIRUS 2 (SARS-COV-2) (CORONAVIRUS DISEASE [COVID-19]), AMPLIFIED PROBE TECHNIQUE, MAKING USE OF HIGH THROUGHPUT TECHNOLOGIES AS DESCRIBED BY CMS-2020-01-R: HCPCS

## 2021-06-10 LAB — SARS-COV-2, COV2NT: NOT DETECTED

## 2021-06-14 ENCOUNTER — HOSPITAL ENCOUNTER (OUTPATIENT)
Dept: SLEEP MEDICINE | Age: 61
Discharge: HOME | End: 2021-06-14
Attending: INTERNAL MEDICINE
Payer: COMMERCIAL

## 2021-06-14 DIAGNOSIS — G47.33 OBSTRUCTIVE SLEEP APNEA: ICD-10-CM

## 2021-06-14 PROCEDURE — 95811 POLYSOM 6/>YRS CPAP 4/> PARM: CPT | Performed by: INTERNAL MEDICINE

## 2021-06-15 ENCOUNTER — DOCUMENTATION ONLY (OUTPATIENT)
Dept: SLEEP MEDICINE | Age: 61
End: 2021-06-15

## 2021-06-15 VITALS
OXYGEN SATURATION: 98 % | HEART RATE: 80 BPM | SYSTOLIC BLOOD PRESSURE: 146 MMHG | TEMPERATURE: 98 F | BODY MASS INDEX: 40.4 KG/M2 | HEIGHT: 61 IN | WEIGHT: 214 LBS | DIASTOLIC BLOOD PRESSURE: 84 MMHG

## 2021-06-15 NOTE — PROGRESS NOTES
[address] [facility] [place], [address]  Tel.  [phone]  Fax. 100 [facility] 60  [place], [address] [facility]  Tel.  [phone]  Fax. [phone] 9[address] Brennan [place]uno   Tel.  [phone]  Fax. [phone]     Sleep Study Technical Notes        PRE-Test:   April Stephanie (: 1960) arrived in the lobby. Patient was greeted, temperature checked (98 ) and screening questions asked. The patient was taken to the Sleep Center and taken directly to his/her room. BP (146/84) and SaO2 (98) were taken. Weight per patient (214lb). Procedure explained to the patient and questions were answered. The patient expressed understanding of the procedure. Electrodes were applied without incident. The patient was placed in bed and the study was started. Acquisition Notes:   Lights off:10:01 pm   Respiratory events: Hypopneas   ECG:  NSR   PAP titration: CPAP   Desensitization Mask(s) Used: ResMed P30i small  o Other comments:   o Patient used a walker  o Pt wanted to sleep with extra pillows for elevation        POST Test:   Patient was awakened. Electrodes were removed. The patient was discharged after answering the Post Questionnaire. Patient stated that he/she was alert and ok to drive.  Equipment and room cleaned per infection control policy.

## 2021-06-29 ENCOUNTER — TELEPHONE (OUTPATIENT)
Dept: SLEEP MEDICINE | Age: 61
End: 2021-06-29

## 2021-06-29 DIAGNOSIS — Z11.52 ENCOUNTER FOR SCREENING FOR COVID-19: ICD-10-CM

## 2021-06-29 DIAGNOSIS — G47.33 OBSTRUCTIVE SLEEP APNEA: Primary | ICD-10-CM

## 2021-06-30 ENCOUNTER — DOCUMENTATION ONLY (OUTPATIENT)
Dept: SLEEP MEDICINE | Age: 61
End: 2021-06-30

## 2021-06-30 NOTE — TELEPHONE ENCOUNTER
Reviewed sleep study results with patient. She expressed understanding and is willing to proceed with a BILEVEL Titration. Please schedule covid 19 testing and titration study.     Thanks

## 2021-06-30 NOTE — TELEPHONE ENCOUNTER
Aislinn Brown is to be contacted by lead sleep technologist regarding results of Sleep Testing which was indicative of CPAP Failure due to severe apnea in REM Supine position. * A second polysomnogram has been ordered for mask fitting and Bi-Level pressure titration. * Follow-up appointment will be scheduled 8-12 weeks following PAP initiation to gauge treatment response and compliance. Encounter Diagnoses   Name Primary?  Obstructive sleep apnea Yes    Encounter for screening for COVID-19        Orders Placed This Encounter    NOVEL CORONAVIRUS (COVID-19)     Standing Status:   Future     Number of Occurrences:   1     Standing Expiration Date:   9/29/2021     Scheduling Instructions:      1) Due to current limited availability of the COVID-19 PCR test, tests will be prioritized and may not be completed.              2) Order only if the test result will change clinical management or necessary for a return to mission-critical employment decision.              3) Print and instruct patient to adhere to [facility] home isolation program. (Link Above)              4) Set up or refer patient for a monitoring program.              5) Have patient sign up for and leverage [employer] (if not previously done). Order Specific Question:   Status     Answer:   Asymptomatic/Surveillance(e.g. pre-op/pre-procedure/pre-delivery/transfer)     Order Specific Question:   Reason for Test     Answer:   Upcoming elective surgery/procedure/delivery, return to work, or discharge to another facility    SLEEP LAB (PAP TITRATION)     Standing Status:   Future     Standing Expiration Date:   9/29/2021     Scheduling Instructions:      Perform Bi-level Titration.      Order Specific Question:   Reason for Exam     Answer:   JOSE RAFAEL

## 2021-07-28 ENCOUNTER — OFFICE VISIT (OUTPATIENT)
Dept: SLEEP MEDICINE | Age: 61
End: 2021-07-28

## 2021-07-28 DIAGNOSIS — G47.33 OSA (OBSTRUCTIVE SLEEP APNEA): Primary | ICD-10-CM

## 2021-07-28 NOTE — PROGRESS NOTES
Tech in appropriate PPE. Patient taken to room. Nasal Swab  COVID Test explained to patient to be done prior to titration study. Swab of both nostrils completed. Patient will be called for a detected result. PAP titration appointment confirmed. Patient doesn't need assistance during study. Patient's overnight study was confirmed during this visit. (If patient does need assistance, caregiver must come with patient).

## 2021-07-30 LAB
SARS-COV-2, NAA 2 DAY TAT: NORMAL
SARS-COV-2, NAA: NOT DETECTED

## 2021-08-01 ENCOUNTER — HOSPITAL ENCOUNTER (OUTPATIENT)
Dept: SLEEP MEDICINE | Age: 61
Discharge: HOME | End: 2021-08-01
Payer: COMMERCIAL

## 2021-08-01 VITALS
WEIGHT: 214 LBS | HEIGHT: 61 IN | TEMPERATURE: 98.1 F | BODY MASS INDEX: 40.4 KG/M2 | OXYGEN SATURATION: 98 % | DIASTOLIC BLOOD PRESSURE: 81 MMHG | SYSTOLIC BLOOD PRESSURE: 133 MMHG

## 2021-08-01 DIAGNOSIS — G47.33 OBSTRUCTIVE SLEEP APNEA: ICD-10-CM

## 2021-08-01 PROCEDURE — 95811 POLYSOM 6/>YRS CPAP 4/> PARM: CPT | Performed by: INTERNAL MEDICINE

## 2021-08-02 ENCOUNTER — DOCUMENTATION ONLY (OUTPATIENT)
Dept: SLEEP MEDICINE | Age: 61
End: 2021-08-02

## 2021-08-02 NOTE — PROGRESS NOTES
[address] [place] [place], [address]  Tel.  [phone]  Fax. 100 [facility] 60  [place], [address] [facility]  Tel.  [phone]  Fax. [phone] 9[address] [facility] Brennan Patel   Tel.  [phone]  Fax. [phone]     Sleep Study Technical Notes        PRE-Test:   April Stephanie (: 1960) arrived. . Patient was greeted, temperature checked (98.1 ) and screening questions asked. The patient was taken to his/her room. BP (133/81) and SaO2 (98) were taken. Procedure explained to the patient and questions were answered. The patient expressed understanding of the procedure. Electrodes were applied without incident. The patient was placed in bed and the study was started.  PAP mask acclimation for **min. Patient did/didn't tolerate mask. Acquisition Notes:   Lights off: 10:42 pm     Respiratory events: Hypopneas, OA's   ECG:  NSR   PAP titration: BiPAP   Desensitization Mask(s) Used: ResMed P30i medium pillows      POST Test:   Patient was awakened. Electrodes were removed. The patient was discharged after answering the Post Questionnaire. Patient stated that he/she was alert and ok to drive.  Equipment and room cleaned per infection control policy.

## 2021-08-05 ENCOUNTER — TELEPHONE (OUTPATIENT)
Dept: SLEEP MEDICINE | Age: 61
End: 2021-08-05

## 2021-08-05 DIAGNOSIS — G47.33 OBSTRUCTIVE SLEEP APNEA: Primary | ICD-10-CM

## 2021-08-05 NOTE — TELEPHONE ENCOUNTER
Orders Placed This Encounter    AMB SUPPLY ORDER     Diagnosis: Sleep Apnea ICD-10 Code (G47.30); ICD-9 Code (780.57). Positive Airway Pressure Therapy: Duration of need: 99 months. ResMed VPAP Auto (VAuto Mode): Maximum IPAP: 20 cmH2O; Minimum EPAP: 9 cmH2O; PS: 8 cmH2O. Ramp Time: 30 Minutes. CPAP mask -  As fitted during titration OR patient preference, headgear, tubing, and filter;  heated humidifier; wireless modem. Remote monitoring enrollment. Cipriano Stanton MD, FAA; NPI: [id number]  Electronically signed. 08/05/21

## 2021-08-09 ENCOUNTER — DOCUMENTATION ONLY (OUTPATIENT)
Dept: SLEEP MEDICINE | Age: 61
End: 2021-08-09

## 2021-08-10 ENCOUNTER — TELEPHONE (OUTPATIENT)
Dept: SLEEP MEDICINE | Age: 61
End: 2021-08-10

## 2021-08-10 NOTE — TELEPHONE ENCOUNTER
PATIENT RESPONSE / FOLLOW UP NEEDS  Patient unsure of plans to continue/discontinue use of device    RECALL EDUCATION PROVIDED    Are you aware of why there is a recall? - Yes  o You can visit the [facility] for more information. o Recall is related specifically to the type of foam used to reduce the noise made by the device. Over time, the foam may break down into small black particles that have the potential to be inhaled. [employer] has also done testing that shows breakdown of the foam can release unsafe gases. - In 2020, the complaint rate for foam particles was low (0.03%)  - NO REPORTS OF DEATH  - NO COMPLAINTS OF CHEMICAL EXPOSURE  o Potential risks include  - Headache  - Irritation of the airway, skin, or eyes  - Asthma  - Nausea or Vomiting    RECOMMENDATIONS:    The first step is to make sure we have your device added to the [employer] in order to begin a claim. Have you already been contacted by [employer] or your medical equipment provider?  - No.  (You can register your device through the [facility] or by calling them at [phone]. Based on [employer] communication, you should stop using your equipment, but it is important that you consider the risks of both continuing and discontinuing the use of your device to treat your sleep apnea. If you choose to continue using your machine, it is at your own discretion. There is not a clear answer, and this is a difficult situation. As for actions, you should register your device by phone or access the website as explained earlier. It is also recommended that you DO NOT USE any type of CPAP cleaning machines or harsh chemicals. We have heard that the use of ozone or other cleaning devices may be contributing to the problem by increasing the rate at which the foam can potentially break down.  Therefore, we advise you to follow the 's instructions to clean your tube, tank, and mask with mild soap and water. If you are using ozone products to clean your PAP device, [employer] advises you to STOP. If you choose to stop using your device, you may benefit from the use of alternate therapies such as  - Positional changes  o Avoid sleeping flat or on your back, elevate the head of your bed with pillows or other positional devices such as slumber bumper, sleep noodle, wedge. o Sleeping in a recliner  - Mandibular devices that reposition jaw for better airflow.   o You can get a referral for this from you doctor if you would like to follow up with this option.   - Use of inline bacteria filter.   o Both [employer] and the FDA have noted that additional bacterial filters are not designed or intended for use with APAP devices and may cause device issues. Additionally, these filters do not resolve vapor or fume concerns from the foam in the devices. - Because these are not FDA approved, we cannot order these filters for you, but other patients have elected to purchase them on their own through a [address] [place] [address] or online.   - If you chose to use an inline filter, it is important that you research how it may affect the performance of your device. If the filter becomes clogged, you will not be getting the prescribed amount of pressure, and it could further change how the CPAP operates. COMMUNICATION  The fastest way for us to communicate with you is through [address] Are you signed up and active with this? No  Do we have your current Email Address? Yes. Would you like to give permission to receive information via email and text as well?

## 2021-09-28 ENCOUNTER — TRANSCRIBE ORDER (OUTPATIENT)
Dept: SCHEDULING | Age: 61
End: 2021-09-28

## 2021-09-28 DIAGNOSIS — Z12.31 SCREENING MAMMOGRAM FOR HIGH-RISK PATIENT: Primary | ICD-10-CM

## 2021-11-12 ENCOUNTER — TELEPHONE (OUTPATIENT)
Dept: SLEEP MEDICINE | Age: 61
End: 2021-11-12

## 2021-11-12 ENCOUNTER — VIRTUAL VISIT (OUTPATIENT)
Dept: SLEEP MEDICINE | Age: 61
End: 2021-11-12
Payer: COMMERCIAL

## 2021-11-12 VITALS
WEIGHT: 221 LBS | SYSTOLIC BLOOD PRESSURE: 134 MMHG | DIASTOLIC BLOOD PRESSURE: 89 MMHG | BODY MASS INDEX: 39.16 KG/M2 | HEIGHT: 63 IN

## 2021-11-12 DIAGNOSIS — I10 ESSENTIAL HYPERTENSION: ICD-10-CM

## 2021-11-12 DIAGNOSIS — F32.A ANXIETY AND DEPRESSION: ICD-10-CM

## 2021-11-12 DIAGNOSIS — G47.33 OSA TREATED WITH BIPAP: Primary | ICD-10-CM

## 2021-11-12 DIAGNOSIS — F41.9 ANXIETY AND DEPRESSION: ICD-10-CM

## 2021-11-12 PROCEDURE — 99214 OFFICE O/P EST MOD 30 MIN: CPT | Performed by: INTERNAL MEDICINE

## 2021-11-12 NOTE — PATIENT INSTRUCTIONS
[address] [facility] [place], [address]  Tel.  [phone]  Fax. [address] [facility]  [place], [address] [facility]  Tel.  [phone]  Fax. [phone] [address]  Tel.  [phone]  Fax. [phone]     Learning About CPAP for Sleep Apnea  What is CPAP? CPAP is a small machine that you use at home every night while you sleep. It increases air pressure in your throat to keep your airway open. When you have sleep apnea, this can help you sleep better so you feel much better. CPAP stands for \"continuous positive airway pressure. \"  The CPAP machine will have one of the following:  · A mask that covers your nose and mouth  · Prongs that fit into your nose  · A mask that covers your nose only, the most common type. This type is called NCPAP. The N stands for \"nasal.\"  Why is it done? CPAP is usually the best treatment for obstructive sleep apnea. It is the first treatment choice and the most widely used. Your doctor may suggest CPAP if you have:  · Moderate to severe sleep apnea. · Sleep apnea and coronary artery disease (CAD) or heart failure. How does it help? · CPAP can help you have more normal sleep, so you feel less sleepy and more alert during the daytime. · CPAP may help keep heart failure or other heart problems from getting worse. · NCPAP may help lower your blood pressure. · If you use CPAP, your bed partner may also sleep better because you are not snoring or restless. What are the side effects? Some people who use CPAP have:  · A dry or stuffy nose and a sore throat. · Irritated skin on the face. · Sore eyes. · Bloating. If you have any of these problems, work with your doctor to fix them. Here are some things you can try:  · Be sure the mask or nasal prongs fit well. · See if your doctor can adjust the pressure of your CPAP. · If your nose is dry, try a humidifier.   · If your nose is runny or stuffy, try decongestant medicine or a steroid nasal spray. If these things do not help, you might try a different type of machine. Some machines have air pressure that adjusts on its own. Others have air pressures that are different when you breathe in than when you breathe out. This may reduce discomfort caused by too much pressure in your nose. Where can you learn more? Go to "[employer].be  Enter Jaziel Box in the search box to learn more about \"Learning About CPAP for Sleep Apnea. \"   © 0971-2655 Healthwise, Incorporated. Care instructions adapted under license by [facility] [employer] (which disclaims liability or warranty for this information). This care instruction is for use with your licensed healthcare professional. If you have questions about a medical condition or this instruction, always ask your healthcare professional. [address] any warranty or liability for your use of this information. Content Version: 7.0.98110; Last Revised: January 11, 2010  PROPER SLEEP HYGIENE    What to avoid  · Do not have drinks with caffeine, such as coffee or black tea, for 8 hours before bed. · Do not smoke or use other types of tobacco near bedtime. Nicotine is a stimulant and can keep you awake. · Avoid drinking alcohol late in the evening, because it can cause you to wake in the middle of the night. · Do not eat a big meal close to bedtime. If you are hungry, eat a light snack. · Do not drink a lot of water close to bedtime, because the need to urinate may wake you up during the night. · Do not read or watch TV in bed. Use the bed only for sleeping and sexual activity. What to try  · Go to bed at the same time every night, and wake up at the same time every morning. Do not take naps during the day. · Keep your bedroom quiet, dark, and cool. · Get regular exercise, but not within 3 to 4 hours of your bedtime. .  · Sleep on a comfortable pillow and mattress.   · If watching the clock makes you anxious, turn it facing away from you so you cannot see the time. · If you worry when you lie down, start a worry book. Well before bedtime, write down your worries, and then set the book and your concerns aside. · Try meditation or other relaxation techniques before you go to bed. · If you cannot fall asleep, get up and go to another room until you feel sleepy. Do something relaxing. Repeat your bedtime routine before you go to bed again. · Make your house quiet and calm about an hour before bedtime. Turn down the lights, turn off the TV, log off the computer, and turn down the volume on music. This can help you relax after a busy day. Drowsy Driving: The [employer] cites drowsiness as a causing factor in more than 927,811 police reported crashes annually, resulting in 76,000 injuries and 1,500 deaths. Other surveys suggest 55% of people polled have driven while drowsy in the past year, 23% had fallen asleep but not crashed, 3% crashed, and 2% had and accident due to drowsy driving. Who is at risk? Young Drivers: One study of drowsy driving accidents states that 55% of the drivers were under 25 years. Of those, 75% were male. Shift Workers and Travelers: People who work overnight or travel across time zones frequently are at higher risk of experiencing Circadian Rhythm Disorders. They are trying to work and function when their body is programed to sleep. Sleep Deprived: Lack of sleep has a serious impact on your ability to pay attention or focus on a task. Consistently getting less than the average of 8 hours your body needs creates partial or cumulative sleep deprivation. Untreated Sleep Disorders: Sleep Apnea, Narcolepsy, R.L.S., and other sleep disorders (untreated) prevent a person from getting enough restful sleep. This leads to excessive daytime sleepiness and increases the risk for drowsy driving accidents by up to 7 times.   Medications / Alcohol: Even over the counter medications can cause drowsiness. Medications that impair a drivers attention should have a warning label. Alcohol naturally makes you sleepy and on its own can cause accidents. Combined with excessive drowsiness its effects are amplified. Signs of Drowsy Driving:   * You don't remember driving the last few miles   * You may drift out of your nidia   * You are unable to focus and your thoughts wander   * You may yawn more often than normal   * You have difficulty keeping your eyes open / nodding off   * Missing traffic signs, speeding, or tailgating  Prevention-   Good sleep hygiene, lifestyle and behavioral choices have the most impact on drowsy driving. There is no substitute for sleep and the average person requires 8 hours nightly. If you find yourself driving drowsy, stop and sleep. Consider the sleep hygiene tips provided during your visit as well. Medication Refill Policy: Refills for all medications require 1 week advance notice. Please have your pharmacy fax a refill request. We are unable to fax, or call in \"controled substance\" medications and you will need to pick these prescriptions up from our office. [employer] Activation    Thank you for requesting access to [employer]. Please follow the instructions below to securely access and download your online medical record. [employer] allows you to send messages to your doctor, view your test results, renew your prescriptions, schedule appointments, and more. How Do I Sign Up? 1. In your internet browser, go to [email or web address]. 2. Click on the First Time User? Click Here link in the Sign In box. You will see the New Member Sign Up page. 3. Enter your [employer] Access Code exactly as it appears below. You will not need to use this code after youve completed the sign-up process. If you do not sign up before the expiration date, you must request a new code.     [employer] Access Code: [email or web address]  Expires: 2021  8:26 AM (This is the date your [facility] access code will )    4. Enter the last four digits of your Social Security Number (xxxx) and Date of Birth (mm/dd/yyyy) as indicated and click Submit. You will be taken to the next sign-up page. 5. Create a Share Practice ID. This will be your Share [facility] login ID and cannot be changed, so think of one that is secure and easy to remember. 6. Create a Share [facility] password. You can change your password at any time. 7. Enter your Password Reset Question and Answer. This can be used at a later time if you forget your password. 8. Enter your e-mail address. You will receive e-mail notification when new information is available in [address] 9. Click Sign Up. You can now view and download portions of your medical record. 10. Click the Download Summary menu link to download a portable copy of your medical information. Additional Information    If you have questions, please call [phone]. Remember, Share [facility] is NOT to be used for urgent needs. For medical emergencies, dial 911.

## 2021-11-12 NOTE — PROGRESS NOTES
[address] [facility] [place], [address]  Tel.  [phone]  Fax. [address] [facility]  [place], [address] [facility]  Tel.  [phone]  Fax. [phone] [address]  Tel.  [phone]  Fax. [phone]       Aislinn Brown (: 1960) is a 63 y.o. female, established patient, seen for positive airway pressure follow-up and evaluation of the following chief complaint(s):   Sleep Problem ([phone] ADHERENCE)       Aislinn Brown was last seen by me on 2021, prior notes reviewed in detail. Split-Night Polysomnogram (PSG) performed on 2012 showed an AHI of 8.5/hr with a lowest SpO2 of 84%. ASSESSMENT/PLAN:    ICD-10-CM ICD-9-CM    1. JOSE RAFAEL treated with BiPAP  G47.33 327.23    2. Anxiety and depression  F41.9 300.00     F32. A 311    3. Essential hypertension  I10 401.9    4. BMI 39.0-39.9,adult  Z68.39 V85.39        On ResMed:  AirCurve 10 VAuto:      Settings:  [place] not available for review at time of visit. She is adherent with PAP therapy and PAP continues to benefit patient and remains necessary for control of her sleep apnea. Follow-up and Dispositions    · Return for follow-up with sleep provider in 1 yr or as needed. 1. Sleep Apnea -   * Continue on current pressures    * Technologist will scan in current download for review. * Counseling was provided regarding the importance of regular PAP use with emphasis on ensuring sufficient total sleep time, proper sleep hygiene, and safe driving. * Re-enforced proper and regular cleaning for the device. * She was asked to contact our office for any problems regarding PAP therapy. 2. Anxiety and Depression-  continue on current regimen, she will continue to monitor her  symptoms and follow up with her treating provider for reevaluation/adjustment of medications if warranted.   I have reviewed the relationship between mood as it relates to sleep-disordered breathing. 3. Hypertension -  continue on current regimen, she will continue to monitor her BP and follow up with her primary care provider for reevaluation/adjustment of medications if warranted. I have reviewed the relationship between hypertension as it relates to sleep-disordered breathing. 4. Recommended a dedicated weight loss program through appropriate diet and exercise regimen as significant weight reduction has been shown to reduce severity of obstructive sleep apnea. SUBJECTIVE/OBJECTIVE:    She  is seen today for follow up on PAP device and reports no problems using the device. The following concerns identified:    Drowsiness no Problems exhaling no   Snoring no Forget to put on no   Mask Comfortable yes Can't fall asleep no   Dry Mouth yes Mask falls off no   Air Leaking no Frequent awakenings no       She admits that her sleep has significantly improved on PAP therapy using nasal mask and heated tubing. Review of device download indicated:    Download not available for review at time of visit. [place] Sleepiness Score: 10   Modified F.O.S.Q. Score Total / 2: 18.5       Sleep Review of Systems: notable for Negative difficulty falling asleep; Positive awakenings at night to urinate; Negative  early morning headaches; Negative  memory problems; Negative  concentration issues; Negative  chest pain; Negative  shortness of breath;  Negative rashes or itching; Negative heartburn / belching / flatulence; Negative  significant mood issues; 3-4 afternoon naps per week. Vitals reported by patient     Patient-Reported Vitals 11/12/2021   Patient-Reported Weight 221   Patient-Reported Systolic  557   Patient-Reported Diastolic 89      Calculated BMI 39.15 kg/m2    Physical Exam completed by visual and auditory observation of patient with verbal input from patient.     General:   Alert, oriented, not in acute distress   Eyes:  Anicteric Sclerae; no obvious strabismus   Nose: No obvious nasal septum deviation    Neck:   Midline trachea, no visible mass   Chest/Lungs:  Respiratory effort normal, no visualized signs of difficulty breathing or respiratory distress   CVS:  No JVD   Extremities:  No obvious rashes noted on face, neck, or hands   Neuro:  No facial asymmetry, no focal deficits; no obvious tremor    Psych:  Normal affect,  normal countenance     April Stephanie is being evaluated by a Virtual Visit (video visit) encounter to address concerns as mentioned above. A caregiver was present when appropriate. Due to this being a TeleHealth encounter (During GBAAS-87 public health emergency), evaluation of the following organ systems was limited: Vitals/Constitutional/EENT/Resp/CV/GI//MS/Neuro/Skin/Heme-Lymph-Imm. Pursuant to the emergency declaration under the [address] [place] [address] authority and the [employer] and [employer] Act, this Virtual Visit was conducted with patient's (and/or legal guardian's) consent, to reduce the patient's risk of exposure to COVID-19 and provide necessary medical care. Patient identification was verified at the start of the visit: YES using name and date of birth. Patient's phone number [phone] (home)  was confirmed for accuracy. She gives permission for messages regarding results and appointments to be left at that number. Services were provided through a video synchronous discussion virtually to substitute for in-person clinic visit. I was in the office while conducting this encounter, patient located at their home or alternate location of their choice. An electronic signature was used to authenticate this note. Sharda Darnell MD, FAASM  Electronically signed.  11/12/21

## 2021-11-12 NOTE — TELEPHONE ENCOUNTER
Technician contacted [address] [place] [address] regarding a download for patient's new device as well as access to her device remotely for the sleep center. The therapist for [address] [place] [address] was out of the office today (11/12/21) and would return on 11/15/21. Technician to follow up on Monday with [facility] also called patient to inform patient of that she may need to bring her device in for a download. Patient expressed understanding and is willing to bring her device in if needed.

## 2021-12-15 ENCOUNTER — TELEPHONE (OUTPATIENT)
Dept: SLEEP MEDICINE | Age: 61
End: 2021-12-15

## 2021-12-23 ENCOUNTER — HOSPITAL ENCOUNTER (OUTPATIENT)
Dept: MAMMOGRAPHY | Age: 61
Discharge: HOME | End: 2021-12-23
Attending: INTERNAL MEDICINE
Payer: COMMERCIAL

## 2021-12-23 DIAGNOSIS — Z12.31 SCREENING MAMMOGRAM FOR HIGH-RISK PATIENT: ICD-10-CM

## 2021-12-23 PROCEDURE — 77067 SCR MAMMO BI INCL CAD: CPT

## 2022-11-15 ENCOUNTER — VIRTUAL VISIT (OUTPATIENT)
Dept: SLEEP MEDICINE | Age: 62
End: 2022-11-15
Payer: COMMERCIAL

## 2022-11-15 ENCOUNTER — DOCUMENTATION ONLY (OUTPATIENT)
Dept: SLEEP MEDICINE | Age: 62
End: 2022-11-15

## 2022-11-15 VITALS
WEIGHT: 216 LBS | DIASTOLIC BLOOD PRESSURE: 70 MMHG | BODY MASS INDEX: 38.27 KG/M2 | HEIGHT: 63 IN | SYSTOLIC BLOOD PRESSURE: 128 MMHG

## 2022-11-15 DIAGNOSIS — F41.9 ANXIETY AND DEPRESSION: ICD-10-CM

## 2022-11-15 DIAGNOSIS — F32.A ANXIETY AND DEPRESSION: ICD-10-CM

## 2022-11-15 DIAGNOSIS — G47.33 OSA TREATED WITH BIPAP: Primary | ICD-10-CM

## 2022-11-15 DIAGNOSIS — I10 ESSENTIAL HYPERTENSION: ICD-10-CM

## 2022-11-15 PROCEDURE — 99213 OFFICE O/P EST LOW 20 MIN: CPT | Performed by: INTERNAL MEDICINE

## 2022-11-15 PROCEDURE — 3078F DIAST BP <80 MM HG: CPT | Performed by: INTERNAL MEDICINE

## 2022-11-15 PROCEDURE — 3074F SYST BP LT 130 MM HG: CPT | Performed by: INTERNAL MEDICINE

## 2022-11-15 NOTE — PATIENT INSTRUCTIONS
[address] [facility] [place]. [place], [address]  Tel.  [phone]  Fax. 100 [facility] 60  [place], [address] [facility]  Tel.  [phone]  Fax. [phone] [address]  Tel.  [phone]  Fax. [phone]     Learning About CPAP for Sleep Apnea  What is CPAP? CPAP is a small machine that you use at home every night while you sleep. It increases air pressure in your throat to keep your airway open. When you have sleep apnea, this can help you sleep better so you feel much better. CPAP stands for \"continuous positive airway pressure. \"  The CPAP machine will have one of the following:  A mask that covers your nose and mouth  Prongs that fit into your nose  A mask that covers your nose only, the most common type. This type is called NCPAP. The N stands for \"nasal.\"  Why is it done? CPAP is usually the best treatment for obstructive sleep apnea. It is the first treatment choice and the most widely used. Your doctor may suggest CPAP if you have: Moderate to severe sleep apnea. Sleep apnea and coronary artery disease (CAD) or heart failure. How does it help? CPAP can help you have more normal sleep, so you feel less sleepy and more alert during the daytime. CPAP may help keep heart failure or other heart problems from getting worse. NCPAP may help lower your blood pressure. If you use CPAP, your bed partner may also sleep better because you are not snoring or restless. What are the side effects? Some people who use CPAP have:  A dry or stuffy nose and a sore throat. Irritated skin on the face. Sore eyes. Bloating. If you have any of these problems, work with your doctor to fix them. Here are some things you can try:  Be sure the mask or nasal prongs fit well. See if your doctor can adjust the pressure of your CPAP. If your nose is dry, try a humidifier.   If your nose is runny or stuffy, try decongestant medicine or a steroid nasal spray. If these things do not help, you might try a different type of machine. Some machines have air pressure that adjusts on its own. Others have air pressures that are different when you breathe in than when you breathe out. This may reduce discomfort caused by too much pressure in your nose. Where can you learn more? Go to Onarbor.be  Enter Lili Stevenoracio in the search box to learn more about \"Learning About CPAP for Sleep Apnea. \"   © 3115-0265 Healthwise, Incorporated. Care instructions adapted under license by [employer] (which disclaims liability or warranty for this information). This care instruction is for use with your licensed healthcare professional. If you have questions about a medical condition or this instruction, always ask your healthcare professional. [address] any warranty or liability for your use of this information. Content Version: 4.4.47375; Last Revised: January 11, 2010  PROPER SLEEP HYGIENE    What to avoid  Do not have drinks with caffeine, such as coffee or black tea, for 8 hours before bed. Do not smoke or use other types of tobacco near bedtime. Nicotine is a stimulant and can keep you awake. Avoid drinking alcohol late in the evening, because it can cause you to wake in the middle of the night. Do not eat a big meal close to bedtime. If you are hungry, eat a light snack. Do not drink a lot of water close to bedtime, because the need to urinate may wake you up during the night. Do not read or watch TV in bed. Use the bed only for sleeping and sexual activity. What to try  Go to bed at the same time every night, and wake up at the same time every morning. Do not take naps during the day. Keep your bedroom quiet, dark, and cool. Get regular exercise, but not within 3 to 4 hours of your bedtime. .  Sleep on a comfortable pillow and mattress.   If watching the clock makes you anxious, turn it facing away from you so you cannot see the time. If you worry when you lie down, start a worry book. Well before bedtime, write down your worries, and then set the book and your concerns aside. Try meditation or other relaxation techniques before you go to bed. If you cannot fall asleep, get up and go to another room until you feel sleepy. Do something relaxing. Repeat your bedtime routine before you go to bed again. Make your house quiet and calm about an hour before bedtime. Turn down the lights, turn off the TV, log off the computer, and turn down the volume on music. This can help you relax after a busy day. Drowsy Driving: The [employer] cites drowsiness as a causing factor in more than 527,009 police reported crashes annually, resulting in 76,000 injuries and 1,500 deaths. Other surveys suggest 55% of people polled have driven while drowsy in the past year, 23% had fallen asleep but not crashed, 3% crashed, and 2% had and accident due to drowsy driving. Who is at risk? Young Drivers: One study of drowsy driving accidents states that 55% of the drivers were under 25 years. Of those, 75% were male. Shift Workers and Travelers: People who work overnight or travel across time zones frequently are at higher risk of experiencing Circadian Rhythm Disorders. They are trying to work and function when their body is programed to sleep. Sleep Deprived: Lack of sleep has a serious impact on your ability to pay attention or focus on a task. Consistently getting less than the average of 8 hours your body needs creates partial or cumulative sleep deprivation. Untreated Sleep Disorders: Sleep Apnea, Narcolepsy, R.L.S., and other sleep disorders (untreated) prevent a person from getting enough restful sleep. This leads to excessive daytime sleepiness and increases the risk for drowsy driving accidents by up to 7 times.   Medications / Alcohol: Even over the counter medications can cause drowsiness. Medications that impair a drivers attention should have a warning label. Alcohol naturally makes you sleepy and on its own can cause accidents. Combined with excessive drowsiness its effects are amplified. Signs of Drowsy Driving:   * You don't remember driving the last few miles   * You may drift out of your nidia   * You are unable to focus and your thoughts wander   * You may yawn more often than normal   * You have difficulty keeping your eyes open / nodding off   * Missing traffic signs, speeding, or tailgating  Prevention-   Good sleep hygiene, lifestyle and behavioral choices have the most impact on drowsy driving. There is no substitute for sleep and the average person requires 8 hours nightly. If you find yourself driving drowsy, stop and sleep. Consider the sleep hygiene tips provided during your visit as well. Medication Refill Policy: Refills for all medications require 1 week advance notice. Please have your pharmacy fax a refill request. We are unable to fax, or call in \"controled substance\" medications and you will need to pick these prescriptions up from our office. Restaro Activation    Thank you for requesting access to Restaro. Please follow the instructions below to securely access and download your online medical record. Restaro allows you to send messages to your doctor, view your test results, renew your prescriptions, schedule appointments, and more. How Do I Sign Up? In your internet browser, go to [email or web address]. Click on the First Time User? Click Here link in the Sign In box. You will see the New Member Sign Up page. Enter your Restaro Access Code exactly as it appears below. You will not need to use this code after youve completed the sign-up process. If you do not sign up before the expiration date, you must request a new code.     Restaro Access Code: [email or web address]  Expires: 12/30/2022  8:29 AM (This is the date your Restaro access code will )    Enter the last four digits of your Social Security Number (xxxx) and Date of Birth (mm/dd/yyyy) as indicated and click Submit. You will be taken to the next sign-up page. Create a SampalRx ID. This will be your SampalRx login ID and cannot be changed, so think of one that is secure and easy to remember. Create a SampalRx password. You can change your password at any time. Enter your Password Reset Question and Answer. This can be used at a later time if you forget your password. Enter your e-mail address. You will receive e-mail notification when new information is available in [address] Click Sign Up. You can now view and download portions of your medical record. Click the Dashbid link to download a portable copy of your medical information. Additional Information    If you have questions, please call [phone]. Remember, SampalRx is NOT to be used for urgent needs. For medical emergencies, dial 911.
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
## 2022-12-07 ENCOUNTER — TRANSCRIBE ORDER (OUTPATIENT)
Dept: SCHEDULING | Age: 62
End: 2022-12-07

## 2022-12-07 DIAGNOSIS — Z12.31 OTHER SCREENING MAMMOGRAM: Primary | ICD-10-CM

## 2023-01-30 ENCOUNTER — TRANSCRIBE ORDER (OUTPATIENT)
Dept: SCHEDULING | Age: 63
End: 2023-01-30

## 2023-01-30 DIAGNOSIS — R13.10 DYSPHAGIA: Primary | ICD-10-CM

## 2023-02-23 ENCOUNTER — HOSPITAL ENCOUNTER (OUTPATIENT)
Dept: GENERAL RADIOLOGY | Age: 63
Discharge: HOME | End: 2023-02-23
Attending: INTERNAL MEDICINE
Payer: MEDICAID

## 2023-02-23 DIAGNOSIS — R13.10 DYSPHAGIA: ICD-10-CM

## 2023-02-23 PROCEDURE — 74246 X-RAY XM UPR GI TRC 2CNTRST: CPT

## 2023-04-20 ENCOUNTER — HOSPITAL ENCOUNTER (OUTPATIENT)
Dept: MAMMOGRAPHY | Age: 63
Discharge: HOME | End: 2023-04-20
Attending: INTERNAL MEDICINE
Payer: MEDICAID

## 2023-04-20 DIAGNOSIS — Z12.31 OTHER SCREENING MAMMOGRAM: ICD-10-CM

## 2023-04-20 PROCEDURE — 77063 BREAST TOMOSYNTHESIS BI: CPT

## 2023-04-22 DIAGNOSIS — Z12.31 OTHER SCREENING MAMMOGRAM: Primary | ICD-10-CM

## 2023-04-29 RX ORDER — OMEPRAZOLE 20 MG/1
CAPSULE, DELAYED RELEASE ORAL DAILY
COMMUNITY

## 2023-04-29 RX ORDER — ALPRAZOLAM 0.5 MG/1
TABLET ORAL
COMMUNITY

## 2023-04-29 RX ORDER — AMMONIUM LACTATE 12 G/100G
LOTION TOPICAL
COMMUNITY

## 2023-04-29 RX ORDER — HYDROCHLOROTHIAZIDE 25 MG/1
TABLET ORAL DAILY
COMMUNITY

## 2023-04-29 RX ORDER — ATORVASTATIN CALCIUM 20 MG/1
TABLET, FILM COATED ORAL
COMMUNITY

## 2023-11-02 RX ORDER — SODIUM CHLORIDE 9 MG/ML
INJECTION, SOLUTION INTRAVENOUS CONTINUOUS
Status: CANCELLED | OUTPATIENT
Start: 2023-11-02

## 2023-11-02 RX ORDER — SODIUM CHLORIDE 9 MG/ML
25 INJECTION, SOLUTION INTRAVENOUS PRN
Status: CANCELLED | OUTPATIENT
Start: 2023-11-02

## 2023-11-02 RX ORDER — SODIUM CHLORIDE 0.9 % (FLUSH) 0.9 %
5-40 SYRINGE (ML) INJECTION PRN
Status: CANCELLED | OUTPATIENT
Start: 2023-11-02

## 2023-11-02 RX ORDER — SODIUM CHLORIDE 0.9 % (FLUSH) 0.9 %
5-40 SYRINGE (ML) INJECTION EVERY 12 HOURS SCHEDULED
Status: CANCELLED | OUTPATIENT
Start: 2023-11-02

## 2023-11-03 ENCOUNTER — ANESTHESIA EVENT (OUTPATIENT)
Facility: HOSPITAL | Age: 63
End: 2023-11-03
Payer: COMMERCIAL

## 2023-11-03 ENCOUNTER — ANESTHESIA (OUTPATIENT)
Facility: HOSPITAL | Age: 63
End: 2023-11-03
Payer: COMMERCIAL

## 2023-11-03 ENCOUNTER — HOSPITAL ENCOUNTER (OUTPATIENT)
Facility: HOSPITAL | Age: 63
Setting detail: OUTPATIENT SURGERY
Discharge: HOME | End: 2023-11-03
Attending: INTERNAL MEDICINE | Admitting: INTERNAL MEDICINE
Payer: COMMERCIAL

## 2023-11-03 VITALS
HEART RATE: 92 BPM | WEIGHT: 227 LBS | OXYGEN SATURATION: 96 % | RESPIRATION RATE: 21 BRPM | SYSTOLIC BLOOD PRESSURE: 124 MMHG | BODY MASS INDEX: 40.22 KG/M2 | HEIGHT: 63 IN | TEMPERATURE: 98 F | DIASTOLIC BLOOD PRESSURE: 77 MMHG

## 2023-11-03 PROCEDURE — 3700000000 HC ANESTHESIA ATTENDED CARE: Performed by: INTERNAL MEDICINE

## 2023-11-03 PROCEDURE — 2580000003 HC RX 258: Performed by: STUDENT IN AN ORGANIZED HEALTH CARE EDUCATION/TRAINING PROGRAM

## 2023-11-03 PROCEDURE — 7100000010 HC PHASE II RECOVERY - FIRST 15 MIN: Performed by: INTERNAL MEDICINE

## 2023-11-03 PROCEDURE — 2500000003 HC RX 250 WO HCPCS: Performed by: STUDENT IN AN ORGANIZED HEALTH CARE EDUCATION/TRAINING PROGRAM

## 2023-11-03 PROCEDURE — 88305 TISSUE EXAM BY PATHOLOGIST: CPT

## 2023-11-03 PROCEDURE — 3600007502: Performed by: INTERNAL MEDICINE

## 2023-11-03 PROCEDURE — 7100000011 HC PHASE II RECOVERY - ADDTL 15 MIN: Performed by: INTERNAL MEDICINE

## 2023-11-03 PROCEDURE — 2709999900 HC NON-CHARGEABLE SUPPLY: Performed by: INTERNAL MEDICINE

## 2023-11-03 PROCEDURE — 6360000002 HC RX W HCPCS: Performed by: STUDENT IN AN ORGANIZED HEALTH CARE EDUCATION/TRAINING PROGRAM

## 2023-11-03 RX ORDER — SODIUM CHLORIDE, SODIUM LACTATE, POTASSIUM CHLORIDE, CALCIUM CHLORIDE 600; 310; 30; 20 MG/100ML; MG/100ML; MG/100ML; MG/100ML
INJECTION, SOLUTION INTRAVENOUS CONTINUOUS PRN
Status: DISCONTINUED | OUTPATIENT
Start: 2023-11-03 | End: 2023-11-03 | Stop reason: SDUPTHER

## 2023-11-03 RX ORDER — OXYCODONE HYDROCHLORIDE AND ACETAMINOPHEN 325; 5 MG/5ML; MG/5ML
SOLUTION ORAL EVERY 4 HOURS PRN
COMMUNITY

## 2023-11-03 RX ORDER — LIDOCAINE HYDROCHLORIDE 20 MG/ML
INJECTION, SOLUTION EPIDURAL; INFILTRATION; INTRACAUDAL; PERINEURAL PRN
Status: DISCONTINUED | OUTPATIENT
Start: 2023-11-03 | End: 2023-11-03 | Stop reason: SDUPTHER

## 2023-11-03 RX ORDER — IBUPROFEN 800 MG/1
800 TABLET ORAL EVERY 6 HOURS PRN
Status: ON HOLD | COMMUNITY
End: 2023-11-03 | Stop reason: HOSPADM

## 2023-11-03 RX ORDER — POTASSIUM CHLORIDE 1.5 G/1.58G
20 POWDER, FOR SOLUTION ORAL 2 TIMES DAILY
COMMUNITY

## 2023-11-03 RX ADMIN — PROPOFOL 100 MG: 10 INJECTION, EMULSION INTRAVENOUS at 12:27

## 2023-11-03 RX ADMIN — LIDOCAINE HYDROCHLORIDE 60 MG: 20 INJECTION, SOLUTION EPIDURAL; INFILTRATION; INTRACAUDAL; PERINEURAL at 12:27

## 2023-11-03 RX ADMIN — SODIUM CHLORIDE, POTASSIUM CHLORIDE, SODIUM LACTATE AND CALCIUM CHLORIDE: 600; 310; 30; 20 INJECTION, SOLUTION INTRAVENOUS at 12:23

## 2023-11-03 RX ADMIN — PROPOFOL 50 MG: 10 INJECTION, EMULSION INTRAVENOUS at 12:29

## 2023-11-03 NOTE — PROGRESS NOTES

## 2023-11-03 NOTE — DISCHARGE INSTRUCTIONS
[place], [address] [facility]    EGD DISCHARGE INSTRUCTIONS    Brooklynn Mitchell  [id number]  1960    Discomfort:  Sore throat- throat lozenges or warm salt water gargle  redness at IV site- apply warm compress to area; if redness or soreness persist- contact your physician  Gaseous discomfort- walking, belching will help relieve any discomfort  You may not operate a vehicle for 12 hours  You may not engage in an occupation involving machinery or appliances for rest of today  You may not drink alcoholic beverages for at least 12 hours  Avoid making any critical decisions for at least 24 hour  DIET  You may resume your regular diet - however -  remember your colon is empty and a heavy meal will produce gas. Avoid these foods:  vegetables, fried / greasy foods, carbonated drinks    ACTIVITY  You may resume your normal daily activities   Spend the remainder of the day resting -  avoid any strenuous activity. CALL M.D. ANY SIGN OF   Increasing pain, nausea, vomiting  Abdominal distension (swelling)  New increased bleeding (oral or rectal)  Fever (chills)  Pain in chest area  Bloody discharge from nose or mouth  Shortness of breath    Follow-up Instructions:   Call Dr. Yulisa Hartmann for any questions or problems. Telephone # [phone]    ENDOSCOPY FINDINGS:   Your endoscopy showed a small hiatal hernia. The endoscopy was otherwise normal. Biopsies were taken to rule out microscopic inflammation. Please avoid NSAID's. Please continue omeprazole.     Signed By: Yulisa Hartmann MD     11/3/2023  12:44 PM

## 2023-11-03 NOTE — OP NOTE
[facility]  [address], [address] [facility]          Esophago- Gastroduodenoscopy (EGD) Procedure Note    Brooklynn Mitchell  1960  [id number]      Procedure: Endoscopic Gastroduodenoscopy with biopsy    Indication: dysphagia    Pre-operative Diagnosis: see indication above    Post-operative Diagnosis: see findings below    : Abigail Woods MD    Staff: Circulator: Mei Castillo RN  Endoscopy Technician: Kahlil Tran     Referring Provider:  Emerita Yusuf MD      Anesthesia/Sedation: MAC        Procedure Details     After informed consent was obtained for the procedure, with all risks and benefits of procedure explained the patient was taken to the endoscopy suite and placed in the left lateral decubitus position. Following sequential administration of sedation as per above, the endoscope was inserted into the mouth and advanced under direct vision to second portion of the duodenum. A careful inspection was made as the gastroscope was withdrawn, including a retroflexed view of the proximal stomach; findings and interventions are described below. Findings:   Esophagus: normal - cold biopsies taken  Stomach: small hiatal hernia, otherwise normal stomach  Duodenum: normal to second portion    Specimens: 1. esophagus         EBL: None      Complications:   None; patient tolerated the procedure well. Impression:    As above    Recommendations:   Follow up surgical pathology  Continue PPI  Avoid NSAID's    Signed By: Abigail Woods MD     11/3/2023  12:46 PM

## 2023-11-03 NOTE — H&P
[facility]  [address], [address] [facility]        History and Physical       NAME:  Brooklynn Mitchell   :   1960   MRN:   [id number]             History of Present Illness:  Patient is a 66 y.o. who is seen for dysphagia. PMH:  Past Medical History:   Diagnosis Date    Anxiety     Arthritis     Bipolar 1 disorder ([address])     Degenerative disk disease     Depression     Diabetes ([address])     \"border line\"    GERD (gastroesophageal reflux disease)     Hyperlipidemia     Hypertension     Mood disorder (HCC)     affective    RUTH (obstructive sleep apnea) 2012    AHI: 8.5 per hour of sleep       PSH:  No past surgical history on file. Allergies: Allergies   Allergen Reactions    Penicillins Hives    Sulfa Antibiotics Hives       Home Medications:  Prior to Admission Medications   Prescriptions Last Dose Informant Patient Reported? Taking? ALPRAZolam (XANAX) 0.5 MG tablet Past Month  Yes No   Sig: Take by mouth. MULTIPLE MINERALS-VITAMINS PO Past Week  Yes Yes   Sig: Take 1 tablet by mouth daily   ammonium lactate (LAC-HYDRIN) 12 % lotion Past Month  Yes No   Sig: ammonium lactate 12 % lotion   atorvastatin (LIPITOR) 20 MG tablet 2023  Yes No   Sig: take 1 tablet by mouth once daily   hydroCHLOROthiazide (HYDRODIURIL) 25 MG tablet 2023  Yes No   Sig: Take by mouth daily   ibuprofen (ADVIL;MOTRIN) 800 MG tablet Past Week  Yes Yes   Sig: Take 1 tablet by mouth every 6 hours as needed for Pain   omeprazole (PRILOSEC) 20 MG delayed release capsule 2023  Yes No   Sig: Take by mouth daily   oxyCODONE-acetaminophen (ROXICET) 5-325 MG/5ML solution 2023  Yes Yes   Sig: Take by mouth every 4 hours as needed for Pain.   potassium chloride (KLOR-CON) 20 MEQ packet 2023  Yes Yes   Sig: Take 20 mEq by mouth 2 times daily      Facility-Administered Medications: None       Hospital Medications:  No current facility-administered medications for this encounter. Social History:  Social History     Tobacco Use    Smoking status: Former    Smokeless tobacco: Never   Substance Use Topics    Alcohol use: Yes     Alcohol/week: 1.0 standard drink of alcohol       Family History:  Family History   Problem Relation Age of Onset    Breast Cancer Paternal Aunt     Breast Cancer Cousin 72        paunt             Review of Systems:      Constitutional: negative fever, negative chills, negative weight loss  Eyes:   negative visual changes  ENT:   negative sore throat, tongue or lip swelling  Respiratory:  negative cough, negative dyspnea  Cards:  negative for chest pain, palpitations, lower extremity edema  GI:   See HPI  :  negative for frequency, dysuria  Integument:  negative for rash and pruritus  Heme:  negative for easy bruising and gum/nose bleeding  Musculoskel: negative for myalgias,  back pain and muscle weakness  Neuro: negative for headaches, dizziness, vertigo  Psych:  negative for feelings of anxiety, depression       Objective:   Patient Vitals for the past 8 hrs:   BP Pulse Resp SpO2 Height Weight   11/03/23 1207 (!) 152/86 77 18 100 % -- --   11/03/23 1156 -- -- -- -- 1.6 m (5' 3\") 103 kg (227 lb)     No intake/output data recorded. No intake/output data recorded. EXAM:     NEURO-a&o   HEENT-wnl   LUNGS-clear    COR-regular rate and rhythym     ABD-soft , no tenderness, no rebound, good bs     EXT-no edema     Data Review     No results for input(s): \"WBC\", \"HGB\", \"HCT\", \"PLT\" in the last 72 hours. No results for input(s): \"NA\", \"K\", \"CL\", \"CO2\", \"BUN\", \"CREA\", \"GLU\", \"PHOS\", \"CA\" in the last 72 hours. No results for input(s): \"TP\", \"ALB\", \"GLOB\", \"GGT\", \"AML\" in the last 72 hours. Invalid input(s): \"SGOT\", \"GPT\", \"AP\", \"TBIL\", \"AMYP\", \"LPSE\", \"HLPSE\"  No results for input(s): \"INR\", \"APTT\" in the last 72 hours.     Invalid input(s): \"PTP\"       Assessment:     dysphagia     Patient Active Problem List   Diagnosis    Morbid obesity ([address])    Chronic pain

## 2023-11-15 ENCOUNTER — CLINICAL DOCUMENTATION (OUTPATIENT)
Age: 63
End: 2023-11-15

## 2023-11-15 ENCOUNTER — TELEMEDICINE (OUTPATIENT)
Age: 63
End: 2023-11-15
Payer: COMMERCIAL

## 2023-11-15 DIAGNOSIS — G47.33 OBSTRUCTIVE SLEEP APNEA (ADULT) (PEDIATRIC): Primary | ICD-10-CM

## 2023-11-15 DIAGNOSIS — F41.9 ANXIETY DISORDER, UNSPECIFIED TYPE: ICD-10-CM

## 2023-11-15 PROCEDURE — 99214 OFFICE O/P EST MOD 30 MIN: CPT | Performed by: NURSE PRACTITIONER

## 2023-11-15 ASSESSMENT — SLEEP AND FATIGUE QUESTIONNAIRES
HOW LIKELY ARE YOU TO NOD OFF OR FALL ASLEEP WHEN YOU ARE A PASSENGER IN A CAR FOR AN HOUR WITHOUT A BREAK: 1
HOW LIKELY ARE YOU TO NOD OFF OR FALL ASLEEP WHILE LYING DOWN TO REST IN THE AFTERNOON WHEN CIRCUMSTANCES PERMIT: 1
HOW LIKELY ARE YOU TO NOD OFF OR FALL ASLEEP WHILE SITTING AND READING: 1
ESS TOTAL SCORE: 6
HOW LIKELY ARE YOU TO NOD OFF OR FALL ASLEEP WHILE SITTING AND TALKING TO SOMEONE: 0
HOW LIKELY ARE YOU TO NOD OFF OR FALL ASLEEP WHILE SITTING INACTIVE IN A PUBLIC PLACE: 1
HOW LIKELY ARE YOU TO NOD OFF OR FALL ASLEEP WHILE SITTING QUIETLY AFTER LUNCH WITHOUT ALCOHOL: 1
HOW LIKELY ARE YOU TO NOD OFF OR FALL ASLEEP WHILE WATCHING TV: 1
HOW LIKELY ARE YOU TO NOD OFF OR FALL ASLEEP IN A CAR, WHILE STOPPED FOR A FEW MINUTES IN TRAFFIC: 0

## 2023-11-15 NOTE — PATIENT INSTRUCTIONS
[address] [facility], [address]  Tel.  [phone]  Fax. [address] [facility], [address] [place] [address]  Tel.  [phone]  Fax. [phone] [facility], [address] [facility]  Tel.  [phone]  Fax. [phone]     Learning About CPAP for Sleep Apnea  What is CPAP? CPAP is a small machine that you use at home every night while you sleep. It increases air pressure in your throat to keep your airway open. When you have sleep apnea, this can help you sleep better so you feel much better. CPAP stands for \"continuous positive airway pressure. \"  The CPAP machine will have one of the following:  A mask that covers your nose and mouth  Prongs that fit into your nose  A mask that covers your nose only, the most common type. This type is called NCPAP. The N stands for \"nasal.\"  Why is it done? CPAP is usually the best treatment for obstructive sleep apnea. It is the first treatment choice and the most widely used. Your doctor may suggest CPAP if you have: Moderate to severe sleep apnea. Sleep apnea and coronary artery disease (CAD) or heart failure. How does it help? CPAP can help you have more normal sleep, so you feel less sleepy and more alert during the daytime. CPAP may help keep heart failure or other heart problems from getting worse. NCPAP may help lower your blood pressure. If you use CPAP, your bed partner may also sleep better because you are not snoring or restless. What are the side effects? Some people who use CPAP have:  A dry or stuffy nose and a sore throat. Irritated skin on the face. Sore eyes. Bloating. If you have any of these problems, work with your doctor to fix them. Here are some things you can try:  Be sure the mask or nasal prongs fit well. See if your doctor can adjust the pressure of your CPAP. If your nose is dry, try a humidifier.   If your nose is runny or stuffy, try decongestant medicine or a steroid

## 2023-11-15 NOTE — PROGRESS NOTES
Brooklynn Mitchell (: 1960) is a 66 y.o. female, established patient, seen for positive airway pressure follow-up, she was last seen by Dr. Jessica Pelaez on 11/15/2022, prior notes and sleep testing reviewed in detail. In lab split sleep test 2012 showed AHI of 8.5/hr with a lowest SpO2 of 84%. Re-evaluated with Home sleep testin 2021 showed AHI of 10.4/hr. Weight at time of sleep testing 214 pounds. BiPAP titration 2021. Sleep study reports added to media by provider. ASSESSMENT/PLAN:   Diagnosis Orders   1. Obstructive sleep apnea (adult) (pediatric)  DME Order for (Specify) as OP      2. Anxiety disorder, unspecified type        3. Body mass index (BMI) 38.0-38.9, adult            AHI = 10.4(2021). On ResMed Auto Bi - Level :  Max IPAP 20, Min EPAP 9, PS 8 cmH2O. Set up 2021. She is adherent with PAP therapy and PAP continues to benefit patient and remains necessary for control of her sleep apnea. Follow-up and Dispositions    Return in about 1 year (around 11/15/2024) for Annual follow up. Sleep Apnea -   Continue on current pressures    *  Supplies - nasal pillows mask and heated tubing    Orders Placed This Encounter   Procedures    DME Order for (Specify) as OP     Diagnosis: (G47.33) RUTH (obstructive sleep apnea)  (primary encounter diagnosis)     Replacement Supplies for Positive Airway Pressure Therapy Device:   Duration of need: 99 months.  Nasal Pillows (Replace) 2 per month.  Nasal Interface Mask 1 every 3 months.  Pos Airway pressure chin strap   Headgear 1 every 6 months.  Tubing with heating element 1 every 3 months.  Filter(s) Disposable 2 per month.  Filter(s) Non-Disposable 1 every 6 months. .   [address] for Humidifier (Replace) 1 every 6 months. VIKTOR VegaBC; NPI: [id number]    Electronically signed.  Date:- 11/15/23     * Counseling was provided regarding the importance

## 2024-04-26 ENCOUNTER — TRANSCRIBE ORDERS (OUTPATIENT)
Facility: HOSPITAL | Age: 64
End: 2024-04-26

## 2024-04-26 DIAGNOSIS — Z12.31 ENCOUNTER FOR SCREENING MAMMOGRAM FOR MALIGNANT NEOPLASM OF BREAST: Primary | ICD-10-CM

## 2024-06-05 ENCOUNTER — HOSPITAL ENCOUNTER (OUTPATIENT)
Facility: HOSPITAL | Age: 64
Discharge: HOME | End: 2024-06-08
Payer: COMMERCIAL

## 2024-06-05 VITALS — HEIGHT: 63 IN | WEIGHT: 214 LBS | BODY MASS INDEX: 37.92 KG/M2

## 2024-06-05 DIAGNOSIS — Z78.9 DISCOMFORT: ICD-10-CM

## 2024-06-05 DIAGNOSIS — R92.8 ABNORMAL MAMMOGRAM OF BOTH BREASTS: ICD-10-CM

## 2024-06-05 PROCEDURE — G0279 TOMOSYNTHESIS, MAMMO: HCPCS

## 2024-06-05 PROCEDURE — 76642 ULTRASOUND BREAST LIMITED: CPT

## 2025-01-01 ENCOUNTER — HOSPITAL ENCOUNTER (EMERGENCY)
Facility: HOSPITAL | Age: 65
Discharge: HOME | End: 2025-01-01
Attending: EMERGENCY MEDICINE

## 2025-01-01 VITALS
DIASTOLIC BLOOD PRESSURE: 95 MMHG | RESPIRATION RATE: 18 BRPM | SYSTOLIC BLOOD PRESSURE: 123 MMHG | OXYGEN SATURATION: 98 % | TEMPERATURE: 97.7 F | HEIGHT: 62 IN | HEART RATE: 88 BPM | BODY MASS INDEX: 40.46 KG/M2 | WEIGHT: 219.9 LBS

## 2025-01-01 DIAGNOSIS — S39.012A ACUTE MYOFASCIAL STRAIN OF LUMBAR REGION, INITIAL ENCOUNTER: ICD-10-CM

## 2025-01-01 DIAGNOSIS — M54.9 ACUTE ON CHRONIC BACK PAIN: Primary | ICD-10-CM

## 2025-01-01 DIAGNOSIS — R52 INADEQUATE PAIN CONTROL: ICD-10-CM

## 2025-01-01 DIAGNOSIS — F11.20 OPIOID DEPENDENCE WITH CURRENT USE (HCC): ICD-10-CM

## 2025-01-01 DIAGNOSIS — G89.29 ACUTE ON CHRONIC BACK PAIN: Primary | ICD-10-CM

## 2025-01-01 DIAGNOSIS — I16.0 HYPERTENSIVE URGENCY: ICD-10-CM

## 2025-01-01 LAB
ANION GAP BLD CALC-SCNC: 12 (ref 10–20)
CA-I BLD-MCNC: 1.07 MMOL/L (ref 1.15–1.33)
CHLORIDE BLD-SCNC: 103 MMOL/L (ref 100–111)
CO2 BLD-SCNC: 30 MMOL/L (ref 22–29)
CREAT UR-MCNC: 0.6 MG/DL (ref 0.6–1.3)
GLUCOSE BLD STRIP.AUTO-MCNC: 118 MG/DL (ref 74–99)
LACTATE BLD-SCNC: 1.48 MMOL/L (ref 0.4–2)
POTASSIUM BLD-SCNC: 3.6 MMOL/L (ref 3.5–5.5)
SERVICE CMNT-IMP: ABNORMAL
SODIUM BLD-SCNC: 145 MMOL/L (ref 136–145)
SPECIMEN SITE: ABNORMAL

## 2025-01-01 PROCEDURE — 96372 THER/PROPH/DIAG INJ SC/IM: CPT

## 2025-01-01 PROCEDURE — 80047 BASIC METABLC PNL IONIZED CA: CPT

## 2025-01-01 PROCEDURE — 6370000000 HC RX 637 (ALT 250 FOR IP): Performed by: EMERGENCY MEDICINE

## 2025-01-01 PROCEDURE — 83605 ASSAY OF LACTIC ACID: CPT

## 2025-01-01 PROCEDURE — 99284 EMERGENCY DEPT VISIT MOD MDM: CPT

## 2025-01-01 PROCEDURE — 6360000002 HC RX W HCPCS: Performed by: EMERGENCY MEDICINE

## 2025-01-01 RX ORDER — METHOCARBAMOL 750 MG/1
750 TABLET, FILM COATED ORAL 3 TIMES DAILY
Qty: 15 TABLET | Refills: 0 | Status: SHIPPED | OUTPATIENT
Start: 2025-01-01 | End: 2025-01-01

## 2025-01-01 RX ORDER — KETOROLAC TROMETHAMINE 30 MG/ML
30 INJECTION, SOLUTION INTRAMUSCULAR; INTRAVENOUS ONCE
Status: COMPLETED | OUTPATIENT
Start: 2025-01-01 | End: 2025-01-01

## 2025-01-01 RX ORDER — PREDNISONE 5 MG/1
TABLET ORAL
Qty: 21 EACH | Refills: 0 | Status: SHIPPED | OUTPATIENT
Start: 2025-01-01 | End: 2025-01-01

## 2025-01-01 RX ORDER — LIDOCAINE 50 MG/G
1 PATCH TOPICAL DAILY
Qty: 10 PATCH | Refills: 0 | Status: SHIPPED | OUTPATIENT
Start: 2025-01-01 | End: 2025-01-01

## 2025-01-01 RX ORDER — PREDNISONE 10 MG/1
TABLET ORAL
Qty: 1 EACH | Refills: 0 | Status: SHIPPED | OUTPATIENT
Start: 2025-01-01

## 2025-01-01 RX ORDER — METHOCARBAMOL 500 MG/1
1000 TABLET, FILM COATED ORAL ONCE
Status: COMPLETED | OUTPATIENT
Start: 2025-01-01 | End: 2025-01-01

## 2025-01-01 RX ORDER — POTASSIUM CHLORIDE 750 MG/1
40 TABLET, EXTENDED RELEASE ORAL ONCE
Status: COMPLETED | OUTPATIENT
Start: 2025-01-01 | End: 2025-01-01

## 2025-01-01 RX ORDER — LIDOCAINE 4 G/G
1 PATCH TOPICAL
Status: DISCONTINUED | OUTPATIENT
Start: 2025-01-01 | End: 2025-01-01 | Stop reason: HOSPADM

## 2025-01-01 RX ORDER — DIAZEPAM 5 MG/1
5 TABLET ORAL ONCE
Status: COMPLETED | OUTPATIENT
Start: 2025-01-01 | End: 2025-01-01

## 2025-01-01 RX ORDER — METHOCARBAMOL 750 MG/1
750 TABLET, FILM COATED ORAL 4 TIMES DAILY
Qty: 40 TABLET | Refills: 0 | Status: SHIPPED | OUTPATIENT
Start: 2025-01-01 | End: 2025-01-11

## 2025-01-01 RX ORDER — LIDOCAINE 50 MG/G
1 PATCH TOPICAL DAILY
Qty: 10 PATCH | Refills: 0 | Status: SHIPPED | OUTPATIENT
Start: 2025-01-01 | End: 2025-01-11

## 2025-01-01 RX ADMIN — POTASSIUM CHLORIDE 40 MEQ: 750 TABLET, EXTENDED RELEASE ORAL at 03:21

## 2025-01-01 RX ADMIN — DIAZEPAM 5 MG: 5 TABLET ORAL at 02:44

## 2025-01-01 RX ADMIN — METHOCARBAMOL 1000 MG: 500 TABLET ORAL at 02:44

## 2025-01-01 RX ADMIN — KETOROLAC TROMETHAMINE 30 MG: 30 INJECTION, SOLUTION INTRAMUSCULAR at 02:44

## 2025-01-01 ASSESSMENT — PAIN SCALES - GENERAL
PAINLEVEL_OUTOF10: 10
PAINLEVEL_OUTOF10: 10

## 2025-01-01 ASSESSMENT — LIFESTYLE VARIABLES
HOW OFTEN DO YOU HAVE A DRINK CONTAINING ALCOHOL: NEVER
HOW MANY STANDARD DRINKS CONTAINING ALCOHOL DO YOU HAVE ON A TYPICAL DAY: PATIENT DOES NOT DRINK
HOW OFTEN DO YOU HAVE A DRINK CONTAINING ALCOHOL: NEVER
HOW MANY STANDARD DRINKS CONTAINING ALCOHOL DO YOU HAVE ON A TYPICAL DAY: PATIENT DOES NOT DRINK

## 2025-01-01 ASSESSMENT — PAIN - FUNCTIONAL ASSESSMENT: PAIN_FUNCTIONAL_ASSESSMENT: 0-10

## 2025-01-01 ASSESSMENT — PAIN DESCRIPTION - DESCRIPTORS
DESCRIPTORS: ACHING
DESCRIPTORS: SHARP

## 2025-01-01 ASSESSMENT — PAIN DESCRIPTION - LOCATION
LOCATION: GENERALIZED
LOCATION: BACK

## 2025-01-01 ASSESSMENT — PAIN DESCRIPTION - PAIN TYPE: TYPE: CHRONIC PAIN

## 2025-01-01 NOTE — ED NOTES
Pt presents ambulatory to ED complaining of lower back pain x \"a while.\" Hx of fibromyalgia and osteoarthritis. Pt reports took ibuprofen 400mg around 9pm yesterday and oxycodone around 4pm yesterday. Pt states, \"it feels like it's jerking in my leg.\" Pt is concerned for abnormal potassium and magnesium due to currently taking spironolactone. Pt denies urinary incontinence and trouble with bowel movement. Daughter is at bedside.     Pt is alert and oriented x 4, RR even and unlabored, skin is warm and dry. Assesment completed and pt updated on plan of care.       Emergency Department Nursing Plan of Care       The Nursing Plan of Care is developed from the Nursing assessment and Emergency Department Attending provider initial evaluation.  The plan of care may be reviewed in the “ED Provider note”.    The Plan of Care was developed with the following considerations:   Patient / Family readiness to learn indicated by:verbalized understanding  Persons(s) to be included in education: patient  Barriers to Learning/Limitations:None    Signed     Marcelo Chirinos RN    1/1/2025   2:01 AM

## 2025-01-01 NOTE — ED NOTES
Discharge instructions were given to the patient by RN.     The patient left the Emergency Department alert and oriented and in no acute distress with 3 prescriptions. The patient was encouraged to call or return to the ED for worsening issues or problems and was encouraged to schedule a follow up appointment for continuing care.     Ambulation assessment completed before discharge.  Pt left Emergency Department ambulating at baseline and at expected ambulatory status with no ortho devices  Ortho device education: none    The patient verbalized understanding of discharge instructions and prescriptions, all questions were answered. The patient has no further concerns at this time.

## 2025-01-01 NOTE — ED NOTES
Entered chart as pt called requesting change of pharmacy. Request fulfilled. No other involvement in the care of this patient.     4:10 AM pt was actually not seen at [facility] but rather at [facility]. Will advise them to call that ED for medication management.      Rojas Dyer, DO  01/01/25 0409       Rojas Dyer, DO  01/01/25 0411

## 2025-01-01 NOTE — ED PROVIDER NOTES
EMERGENCY DEPARTMENT HISTORY AND PHYSICAL EXAM            Please note that this dictation was completed with the assistance of \"Dragon\", the computer voice recognition software. Quite often unanticipated grammatical, syntax, homophones, and other interpretive errors are inadvertently transcribed by the computer software. Please disregard these errors and any errors that have escaped final proofreading. Thank you.    Date of Evaluation: 01/01/25  Patient: Brooklynn Mitchell  Patient Age and Sex: 64 y.o. female   MRN: [id number]  CSN: [id number]  PCP: Ned Burr MD    History of Present Illness     Chief Complaint   Patient presents with    Back Pain     History Provided By: Patient/family/EMS (if available)    History is limited by: Nothing     HPI: Brooklynn Mitchell, 64 y.o. female with past medical history as documented below presents to the ED with c/o of acute on chronic low back pain.  Patient reports a history of fibromyalgia and chronic back pain of lumbar disease.  States that she typically takes oxycodone and Motrin.  Last took oxycodone around 4 PM.  Patient denies any falls or injuries.  Also states concern for low potassium that she does take spironolactone.  Denies any urinary symptoms, no loss of bladder or bowel function.  Pt denies any other exacerbating or ameliorating factors. There are no other complaints, changes or physical findings pertinent to the HPI at this time.    Nursing notes were all reviewed and agreed with or any disagreements were addressed in the HPI.    Past History   Past Medical History:  Past Medical History:   Diagnosis Date    Anxiety     Arthritis     Bipolar 1 disorder (HCC)     Degenerative disk disease     Depression     Diabetes (HCC)     \"border line\"    GERD (gastroesophageal reflux disease)     Hyperlipidemia     Hypertension     Mood disorder (HCC)     affective    RUTH (obstructive sleep apnea) 06/04/2012    AHI: 8.5 per hour of sleep       Past Surgical

## 2025-01-01 NOTE — DISCHARGE INSTRUCTIONS
Thank You!    It was a pleasure taking care of you in our Emergency Department today. We know that when you come to [facility], you are entrusting us with your health, comfort, and safety. Our physicians and nurses honor that trust, and truly appreciate the opportunity to care for you and your loved ones.      We also value your feedback. If you receive a survey about your Emergency Department experience today, please fill it out.  We care about our patients' feedback, and we listen to what you have to say.     Thank you again!    Dr. Catrachito Kwan M.D.      ____________________________________________________________________  I have included a copy of your lab results and/or radiologic studies from today's visit so you can have them easily available at your follow-up visit. We hope you feel better and please do not hesitate to contact the ED if you have any questions at all!    Recent Results (from the past 12 hour(s))   POC CHEMISTRY (NA,K,ICA,GLU,CALC HCT/HGB,LACTATE,CREA,CL)    Collection Time: 01/01/25  2:53 AM   Result Value Ref Range    POC Sodium 145 136 - 145 MMOL/L    POC Potassium 3.6 3.5 - 5.5 MMOL/L    POC Ionized Calcium 1.07 (L) 1.15 - 1.33 mmol/L    POC Chloride 103 100 - 111 MMOL/L    POC Creatinine 0.6 0.6 - 1.3 MG/DL    Anion Gap, POC 12 10 - 20      POC Glucose 118 (H) 74 - 99 MG/DL    eGFR, POC >90 >60 ml/min/1.73m2    POC TCO2 30 (H) 22 - 29 MMOL/L    POC Lactic Acid 1.48 0.40 - 2.00 mmol/L    Source VENOUS BLOOD      Performed by: Taran Robertson        No orders to display     [unfilled]  The exam and treatment you received in the Emergency Department were for an urgent problem and are not intended as complete care. It is important that you follow up with a doctor, nurse practitioner, or physician assistant for ongoing care. If your symptoms become worse or you do not improve as expected and you are unable to reach your usual health care provider, you should return to

## 2025-01-01 NOTE — ED TRIAGE NOTES
Pt present to ED with CC of lower back pain. Pt states the pain hasn't been this bad in 10 years. Pt states the last time it got this bad her potassium and magnesium was low.  Pt took her oxycodone yesterday morning and then some motrin around 9pm with no relief. Pt denies any injury, trauma, or surgery.

## 2025-01-03 ASSESSMENT — ENCOUNTER SYMPTOMS
RHINORRHEA: 0
VOMITING: 0
SORE THROAT: 0
ABDOMINAL PAIN: 0
DIARRHEA: 0
COUGH: 0
EYE PAIN: 0
BACK PAIN: 1
NAUSEA: 0
SHORTNESS OF BREATH: 0

## 2025-04-01 ENCOUNTER — HOSPITAL ENCOUNTER (OUTPATIENT)
Facility: HOSPITAL | Age: 65
Discharge: HOME | End: 2025-04-04
Attending: INTERNAL MEDICINE
Payer: COMMERCIAL

## 2025-04-01 DIAGNOSIS — K44.9 HIATAL HERNIA: ICD-10-CM

## 2025-04-01 DIAGNOSIS — K22.2 GERD WITH STRICTURE WITHOUT ESOPHAGITIS: ICD-10-CM

## 2025-04-01 DIAGNOSIS — K21.9 GERD WITH STRICTURE WITHOUT ESOPHAGITIS: ICD-10-CM

## 2025-04-01 PROCEDURE — 74240 X-RAY XM UPR GI TRC 1CNTRST: CPT

## 2025-04-17 ENCOUNTER — CLINICAL DOCUMENTATION (OUTPATIENT)
Age: 65
End: 2025-04-17

## 2025-04-17 ENCOUNTER — OFFICE VISIT (OUTPATIENT)
Age: 65
End: 2025-04-17
Payer: COMMERCIAL

## 2025-04-17 VITALS
WEIGHT: 228.8 LBS | OXYGEN SATURATION: 100 % | SYSTOLIC BLOOD PRESSURE: 141 MMHG | HEIGHT: 62 IN | BODY MASS INDEX: 42.1 KG/M2 | DIASTOLIC BLOOD PRESSURE: 88 MMHG | HEART RATE: 78 BPM | TEMPERATURE: 97.6 F

## 2025-04-17 DIAGNOSIS — J30.1 SEASONAL ALLERGIC RHINITIS DUE TO POLLEN: ICD-10-CM

## 2025-04-17 DIAGNOSIS — I10 PRIMARY HYPERTENSION: ICD-10-CM

## 2025-04-17 DIAGNOSIS — G47.33 OSA (OBSTRUCTIVE SLEEP APNEA): Primary | ICD-10-CM

## 2025-04-17 PROCEDURE — 3079F DIAST BP 80-89 MM HG: CPT | Performed by: INTERNAL MEDICINE

## 2025-04-17 PROCEDURE — 99214 OFFICE O/P EST MOD 30 MIN: CPT | Performed by: INTERNAL MEDICINE

## 2025-04-17 PROCEDURE — G2211 COMPLEX E/M VISIT ADD ON: HCPCS | Performed by: INTERNAL MEDICINE

## 2025-04-17 PROCEDURE — 3077F SYST BP >= 140 MM HG: CPT | Performed by: INTERNAL MEDICINE

## 2025-04-17 RX ORDER — KETOCONAZOLE 20 MG/G
CREAM TOPICAL
COMMUNITY
Start: 2024-10-02

## 2025-04-17 RX ORDER — OXYCODONE AND ACETAMINOPHEN 5; 325 MG/1; MG/1
TABLET ORAL
COMMUNITY
Start: 2025-03-30

## 2025-04-17 RX ORDER — POTASSIUM CHLORIDE 1500 MG/1
20 TABLET, EXTENDED RELEASE ORAL 2 TIMES DAILY WITH MEALS
COMMUNITY
Start: 2025-02-18

## 2025-04-17 RX ORDER — LEVOCETIRIZINE DIHYDROCHLORIDE 5 MG/1
TABLET, FILM COATED ORAL
COMMUNITY

## 2025-04-17 RX ORDER — OMEPRAZOLE 40 MG/1
40 CAPSULE, DELAYED RELEASE ORAL DAILY
COMMUNITY
Start: 2025-03-24

## 2025-04-17 RX ORDER — IBUPROFEN 800 MG/1
TABLET, FILM COATED ORAL
COMMUNITY
Start: 2025-03-25

## 2025-04-17 RX ORDER — ATORVASTATIN CALCIUM 40 MG/1
40 TABLET, FILM COATED ORAL DAILY
COMMUNITY
Start: 2025-01-25

## 2025-04-17 RX ORDER — LIDOCAINE 50 MG/G
PATCH TOPICAL
COMMUNITY
Start: 2025-03-25

## 2025-04-17 ASSESSMENT — SLEEP AND FATIGUE QUESTIONNAIRES
ESS TOTAL SCORE: 11
HOW LIKELY ARE YOU TO NOD OFF OR FALL ASLEEP WHILE SITTING QUIETLY AFTER LUNCH WITHOUT ALCOHOL: MODERATE CHANCE OF DOZING
HOW LIKELY ARE YOU TO NOD OFF OR FALL ASLEEP WHILE LYING DOWN TO REST IN THE AFTERNOON WHEN CIRCUMSTANCES PERMIT: SLIGHT CHANCE OF DOZING
HOW LIKELY ARE YOU TO NOD OFF OR FALL ASLEEP WHILE SITTING AND READING: SLIGHT CHANCE OF DOZING
HOW LIKELY ARE YOU TO NOD OFF OR FALL ASLEEP WHEN YOU ARE A PASSENGER IN A CAR FOR AN HOUR WITHOUT A BREAK: MODERATE CHANCE OF DOZING
HOW LIKELY ARE YOU TO NOD OFF OR FALL ASLEEP WHILE WATCHING TV: SLIGHT CHANCE OF DOZING
HOW LIKELY ARE YOU TO NOD OFF OR FALL ASLEEP WHILE SITTING INACTIVE IN A PUBLIC PLACE: MODERATE CHANCE OF DOZING
HOW LIKELY ARE YOU TO NOD OFF OR FALL ASLEEP WHILE SITTING AND TALKING TO SOMEONE: SLIGHT CHANCE OF DOZING
HOW LIKELY ARE YOU TO NOD OFF OR FALL ASLEEP IN A CAR, WHILE STOPPED FOR A FEW MINUTES IN TRAFFIC: SLIGHT CHANCE OF DOZING

## 2025-04-17 NOTE — PATIENT INSTRUCTIONS
[address]  [place] [address]  Tel.  [phone]  Fax. [phone] [address]  [place] [address]  Tel.  [phone]  Fax. [phone] [address] [place] [address]  Tel.  [phone]  Fax. [phone]     Learning About CPAP for Sleep Apnea  What is CPAP?              CPAP is a small machine that you use at home every night while you sleep. It increases air pressure in your throat to keep your airway open. When you have sleep apnea, this can help you sleep better so you feel much better. CPAP stands for \"continuous positive airway pressure.\"  The CPAP machine will have one of the following:  A mask that covers your nose and mouth  Prongs that fit into your nose  A mask that covers your nose only, the most common type. This type is called NCPAP. The N stands for \"nasal.\"  Why is it done?  CPAP is usually the best treatment for obstructive sleep apnea. It is the first treatment choice and the most widely used. Your doctor may suggest CPAP if you have:  Moderate to severe sleep apnea.  Sleep apnea and coronary artery disease (CAD) or heart failure.  How does it help?  CPAP can help you have more normal sleep, so you feel less sleepy and more alert during the daytime.  CPAP may help keep heart failure or other heart problems from getting worse.  NCPAP may help lower your blood pressure.  If you use CPAP, your bed partner may also sleep better because you are not snoring or restless.  What are the side effects?  Some people who use CPAP have:  A dry or stuffy nose and a sore throat.  Irritated skin on the face.  Sore eyes.  Bloating.  If you have any of these problems, work with your doctor to fix them. Here are some things you can try:  Be sure the mask or nasal prongs fit well.  See if your doctor can adjust the pressure of your CPAP.  If your nose is dry, try a humidifier.  If your nose is runny or stuffy, try decongestant medicine or a steroid

## 2025-04-17 NOTE — PROGRESS NOTES
[address], [place] [address]  Tel.  [phone]    Fax. [phone]     [address], [place] [address]  Tel.  [phone]    Fax. [phone] [address] [address]  Tel.  [phone]    Fax. [phone]       Brooklynn Mitchell (: 1960) is a 65 y.o. female, established patient, seen for positive airway pressure follow-up and evaluation of the following chief complaint(s):   Sleep Problem (Yearly follow up )       Brooklynn Mitchell was last seen by Natalie Willoughby on 11/15/2023, prior notes reviewed in detail.  In lab split sleep test 2012 showed AHI of 8.5/hr with a lowest SpO2 of 84%.  Re-evaluated with Home sleep testin 2021 showed AHI of 10.4/hr. Weight at time of sleep testing 214 pounds.  BiPAP titration 2021.  Sleep study reports added to media by provider.     ASSESSMENT/PLAN:   Diagnosis Orders   1. RUTH (obstructive sleep apnea)  DME Order for (Specify) as OP    SLEEP LAB (PAP TITRATION)      2. Seasonal allergic rhinitis due to pollen        3. Primary hypertension        4. BMI 40.0-44.9, adult            On ResMed:  AirCurve 10 VAuto:    Settings:  Mode - VAuto    Max IPAP: 20 cmH2O    Min EPAP: 09 cmH2O    PS: 8 cmH2O    Sleep Apnea -   * She is adherent with PAP therapy and PAP continues to benefit patient and remains necessary for control of her sleep apnea.     Patient has gained weight since initial testing on bilevel positive airway pressure therapy.  Repeat testing ordered due to weight gain, worsening sleepiness, increase in blood pressure and elevated AHI on some nights as noted on card download.    * Supplies for his device were ordered as noted below:    Orders Placed This Encounter   Procedures    SLEEP LAB (PAP TITRATION)     Standing Status:   Future     Expected Date:   2025     Expiration Date:   2026     Scheduling Instructions:      Perform Bi-Level titration.    DME Order for

## 2025-04-17 NOTE — PROGRESS NOTES
Device pressure changed remotely to Resmed  Max IPAP 25; Min EPAP 9; PS 8 cmH2O.   PAP card download in 4 weeks.    PRINCIPAL DISCHARGE DIAGNOSIS  Diagnosis: S/P aortic arch reconstruction  Assessment and Plan of Treatment:       SECONDARY DISCHARGE DIAGNOSES  Diagnosis: S/P MVR (mitral valve repair)  Assessment and Plan of Treatment:

## 2025-05-12 ENCOUNTER — HOSPITAL ENCOUNTER (OUTPATIENT)
Facility: HOSPITAL | Age: 65
Discharge: HOME | End: 2025-05-15
Payer: COMMERCIAL

## 2025-05-12 DIAGNOSIS — G47.33 OSA (OBSTRUCTIVE SLEEP APNEA): ICD-10-CM

## 2025-05-12 PROCEDURE — 95811 POLYSOM 6/>YRS CPAP 4/> PARM: CPT | Performed by: INTERNAL MEDICINE

## 2025-05-29 ENCOUNTER — CLINICAL DOCUMENTATION (OUTPATIENT)
Age: 65
End: 2025-05-29

## 2025-05-30 NOTE — PROGRESS NOTES
Titration study interpreted.     * Device pressure change to Bi - Level  Max 20; Min 10; PS 08 cmH2O by lead technologist either remotely or at PAP clinic (notify patient).  Patient should be instructed to sleep on her side or in nonsupine position using extra pillows or one of the commercially available sleep positioning devices.    * PAP card download in 4 weeks.     * Office visit in 6 months or as needed.

## 2025-06-02 ENCOUNTER — TELEPHONE (OUTPATIENT)
Age: 65
End: 2025-06-02

## 2025-06-02 NOTE — TELEPHONE ENCOUNTER
"It was a pleasure taking care of you today.  I've included a brief summary of our discussion and care plan from today's visit below.  Please review this information with your primary care provider.  _______________________________________________________________________    My recommendations are summarized as follows:    1) Head to your local [facility] lab and  2 stool kits.    2) At your convenience, bring in one sample to test for C diff. When you do this, if you don't mind, send my MyChart so I can be on the look out for the result in case it goes to Dr. Otoole instead.  -- If the C diff is positive: We will start vancomycin again, with plan for the intestinal microbiota transplant (IMT), around 4 weeks after starting vancomycin. The vancomycin is continued until time of transplant. I will send you more materials on this if we find ourselves in this situation. In the meantime, you can reference our web site for some good info.  -- [email or web address]  -- If the link doesn't work, in Google, type \"[facility] [employer]\", and it should bring you to this site. Go to the section listed \"Patients\", which should bring you to a drop-down menu and you can select \"post-IMT diet\".   -- If the C diff is negative: We will plan for upper endoscopy and colonoscopy to evaluate for the anemia and loose stools.    3) STOP Pepto-Bismol, omeprazole (Prilosec), pantoprazole (Protonix), esomeprazole (Nexium) for at least two weeks. After you have been off of these medications for two weeks, you can bring in the stool sample for H pylori.  -- If this is positive, we will discuss treatment for this (antibiotics and antacids)  -- If this is negative, no further action needed  -- You can take Tums, Pepcid without concern for interfering with the test    4) In the meantime, you can try Benefiber. Start with 1-2 teaspoons daily, and gradually increase to 1 tablespoon daily over " Device pressure changed to Bi - Level  Max 20; Min 10; PS 08 cmH2O and patient notified.   Patient should be instructed to sleep on her side or in nonsupine position using extra pillows or one of the commercially available sleep positioning devices.     PAP card download in 4 weeks.      the course of a week or so.    5) HOLD imodium for now. We may be able to resume this should the C diff testing be negative, or if you have been through treatment with vancomycin.    Return to GI Clinic in 3-4 months to review your progress.    ______________________________________________________________________    How do I schedule labs, imaging studies, or procedures that were ordered in clinic today?     Labs: To schedule lab appointment at the [facility], use my chart or call [phone]. If you have a [facility] lab closer to home where you are regularly seen you can give them a call.     Procedures: If a colonoscopy, upper endoscopy, breath test, esophageal manometry, or pH impedence was ordered today, our endoscopy team will call you to schedule this. If you have not heard from our endoscopy team within a week, please call [phone] to schedule.     Imaging Studies: If you were scheduled for a CT scan, X-ray, MRI, ultrasound, HIDA scan or other imaging study, please call [phone] to have this scheduled.     Referral: If a referral to another specialty was ordered, expect a phone call or follow instructions above. If you have not heard from anyone regarding your referral in a week, please call our clinic to check the status.     Who do I call with any questions after my visit?  Please be in touch if there are any further questions that arise following today's visit.  There are multiple ways to contact your gastroenterology care team.      During business hours, you may reach a Gastroenterology nurse at [phone]    To schedule or reschedule an appointment, please call [phone].     You can always send a secure message through [employer].  [employer] messages are answered by your nurse or doctor typically within 24 hours.  Please allow extra time on weekends and holidays.      For urgent/emergent questions after business hours, you may reach the on-call GI Fellow by contacting the  [facility]  at [phone].     How will I get the results of any tests ordered?    You will receive all of your results.  If you have signed up for [employer], any tests ordered at your visit will be available to you after your physician reviews them.  Typically this takes 1-2 weeks.  If there are urgent results that require a change in your care plan, your physician or nurse will call you to discuss the next steps.      What is [employer]?  [employer] is a secure way for you to access all of your healthcare records from the [facility].  It is a web based computer program, so you can sign on to it from any location.  It also allows you to send secure messages to your care team.  I recommend signing up for [employer] access if you have not already done so and are comfortable with using a computer.      How to I schedule a follow-up visit?  If you did not schedule a follow-up visit today, please call [phone] to schedule a follow-up office visit.      Sincerely,    Zoie Chapman PA-C  Division of Gastroenterology, Hepatology & Nutrition  [facility]

## 2025-06-04 ENCOUNTER — TRANSCRIBE ORDERS (OUTPATIENT)
Facility: HOSPITAL | Age: 65
End: 2025-06-04

## 2025-06-04 DIAGNOSIS — Z12.31 OTHER SCREENING MAMMOGRAM: Primary | ICD-10-CM

## 2025-07-02 ENCOUNTER — HOSPITAL ENCOUNTER (OUTPATIENT)
Facility: HOSPITAL | Age: 65
Discharge: HOME | End: 2025-07-05
Payer: COMMERCIAL

## 2025-07-02 VITALS — WEIGHT: 224 LBS | BODY MASS INDEX: 41.22 KG/M2 | HEIGHT: 62 IN

## 2025-07-02 DIAGNOSIS — Z12.31 OTHER SCREENING MAMMOGRAM: ICD-10-CM

## 2025-07-02 PROCEDURE — 77063 BREAST TOMOSYNTHESIS BI: CPT

## 2025-07-18 ENCOUNTER — ANESTHESIA EVENT (OUTPATIENT)
Facility: HOSPITAL | Age: 65
End: 2025-07-18
Payer: COMMERCIAL

## 2025-07-18 ENCOUNTER — ANESTHESIA (OUTPATIENT)
Facility: HOSPITAL | Age: 65
End: 2025-07-18
Payer: COMMERCIAL

## 2025-07-18 ENCOUNTER — HOSPITAL ENCOUNTER (OUTPATIENT)
Facility: HOSPITAL | Age: 65
Setting detail: OUTPATIENT SURGERY
Discharge: HOME | End: 2025-07-18
Attending: INTERNAL MEDICINE | Admitting: INTERNAL MEDICINE
Payer: COMMERCIAL

## 2025-07-18 VITALS
SYSTOLIC BLOOD PRESSURE: 146 MMHG | OXYGEN SATURATION: 98 % | HEART RATE: 79 BPM | RESPIRATION RATE: 18 BRPM | TEMPERATURE: 97.4 F | DIASTOLIC BLOOD PRESSURE: 91 MMHG

## 2025-07-18 PROCEDURE — 3700000000 HC ANESTHESIA ATTENDED CARE: Performed by: INTERNAL MEDICINE

## 2025-07-18 PROCEDURE — 7100000010 HC PHASE II RECOVERY - FIRST 15 MIN: Performed by: INTERNAL MEDICINE

## 2025-07-18 PROCEDURE — 88305 TISSUE EXAM BY PATHOLOGIST: CPT

## 2025-07-18 PROCEDURE — 6360000002 HC RX W HCPCS: Performed by: NURSE ANESTHETIST, CERTIFIED REGISTERED

## 2025-07-18 PROCEDURE — 3600007502: Performed by: INTERNAL MEDICINE

## 2025-07-18 PROCEDURE — 2720000010 HC SURG SUPPLY STERILE: Performed by: INTERNAL MEDICINE

## 2025-07-18 PROCEDURE — 7100000011 HC PHASE II RECOVERY - ADDTL 15 MIN: Performed by: INTERNAL MEDICINE

## 2025-07-18 RX ORDER — SODIUM CHLORIDE 0.9 % (FLUSH) 0.9 %
5-40 SYRINGE (ML) INJECTION PRN
Status: DISCONTINUED | OUTPATIENT
Start: 2025-07-18 | End: 2025-07-18 | Stop reason: HOSPADM

## 2025-07-18 RX ORDER — SODIUM CHLORIDE 9 MG/ML
INJECTION, SOLUTION INTRAVENOUS PRN
Status: DISCONTINUED | OUTPATIENT
Start: 2025-07-18 | End: 2025-07-18 | Stop reason: HOSPADM

## 2025-07-18 RX ORDER — SODIUM CHLORIDE 0.9 % (FLUSH) 0.9 %
5-40 SYRINGE (ML) INJECTION EVERY 12 HOURS SCHEDULED
Status: DISCONTINUED | OUTPATIENT
Start: 2025-07-18 | End: 2025-07-18 | Stop reason: HOSPADM

## 2025-07-18 RX ORDER — LIDOCAINE HYDROCHLORIDE 20 MG/ML
INJECTION, SOLUTION EPIDURAL; INFILTRATION; INTRACAUDAL; PERINEURAL
Status: DISCONTINUED | OUTPATIENT
Start: 2025-07-18 | End: 2025-07-18 | Stop reason: SDUPTHER

## 2025-07-18 RX ORDER — SODIUM CHLORIDE 9 MG/ML
INJECTION, SOLUTION INTRAVENOUS CONTINUOUS
Status: DISCONTINUED | OUTPATIENT
Start: 2025-07-18 | End: 2025-07-18 | Stop reason: HOSPADM

## 2025-07-18 RX ADMIN — LIDOCAINE HYDROCHLORIDE 100 MG: 20 INJECTION, SOLUTION EPIDURAL; INFILTRATION; INTRACAUDAL; PERINEURAL at 11:34

## 2025-07-18 RX ADMIN — PROPOFOL 150 MG: 10 INJECTION, EMULSION INTRAVENOUS at 11:34

## 2025-07-18 RX ADMIN — PROPOFOL 50 MG: 10 INJECTION, EMULSION INTRAVENOUS at 11:35

## 2025-07-18 ASSESSMENT — PAIN SCALES - GENERAL
PAINLEVEL_OUTOF10: 0
PAINLEVEL_OUTOF10: 0

## 2025-07-18 ASSESSMENT — PAIN - FUNCTIONAL ASSESSMENT: PAIN_FUNCTIONAL_ASSESSMENT: NONE - DENIES PAIN

## 2025-07-18 NOTE — ANESTHESIA POSTPROCEDURE EVALUATION
Department of Anesthesiology  Postprocedure Note    Patient: Brooklynn Mitchell  MRN: [id number]  YOB: 1960  Date of evaluation: 7/18/2025    Procedure Summary       Date: 07/18/25 Room / [facility]: [facility] ENDO 01 / [facility]    Anesthesia Start: 1127 Anesthesia Stop: 1141    Procedure: ESOPHAGOGASTRODUODENOSCOPY Diagnosis:       Dysphagia, unspecified type      (Dysphagia, unspecified type [R13.10])    Surgeons: Miguel Young MD Responsible Provider: Jackson Suarez MD    Anesthesia Type: MAC ASA Status: 2            Anesthesia Type: MAC    Best Phase I: Best Score: 10    Best Phase II: Best Score: 10    Anesthesia Post Evaluation    Patient location during evaluation: bedside  Nausea & Vomiting: no nausea  Cardiovascular status: blood pressure returned to baseline  Respiratory status: acceptable  Hydration status: euvolemic    No notable events documented.

## 2025-07-18 NOTE — OP NOTE
[facility]  [address], [place] [address]          Esophago- Gastroduodenoscopy (EGD) Procedure Note    Brooklynn Mitchell  1960  [id number]      Procedure: Endoscopic Gastroduodenoscopy with biopsy    Indication: GERD    Pre-operative Diagnosis: see indication above    Post-operative Diagnosis: see findings below    : Miguel Young MD    Staff: * No surgical staff found *     Referring Provider:  Ned Burr MD      Anesthesia/Sedation: MAC        Procedure Details     After informed consent was obtained for the procedure, with all risks and benefits of procedure explained the patient was taken to the endoscopy suite and placed in the left lateral decubitus position.  Following sequential administration of sedation as per above, the endoscope was inserted into the mouth and advanced under direct vision to second portion of the duodenum. A careful inspection was made as the gastroscope was withdrawn, including a retroflexed view of the proximal stomach; findings and interventions are described below.      Findings:   Esophagus: normal  Stomach: small hiatal hernia, patchy antral erythema - cold biopsies taken  Duodenum: normal to second portion    Specimens: 1. Gastric         EBL: None      Complications:   None; patient tolerated the procedure well.           Impression:    As above    Recommendations:  -Continue acid suppression., -Await pathology.    Signed By: Miguel Young MD     7/18/2025  11:43 AM

## 2025-07-18 NOTE — ANESTHESIA PRE PROCEDURE
Department of Anesthesiology  Preprocedure Note       Name:  Brooklynn Mitchell   Age:  65 y.o.  :  1960                                          MRN:  [id number]         Date:  2025      Surgeon: Surgeon(s):  Miguel Young MD    Procedure: Procedure(s):  ESOPHAGOGASTRODUODENOSCOPY    Medications prior to admission:   Prior to Admission medications    Medication Sig Start Date End Date Taking? Authorizing Provider   omeprazole (PRILOSEC) 40 MG delayed release capsule Take 1 capsule by mouth daily 3/24/25  Yes Rosio Rao MD   potassium chloride (KLOR-CON M) 20 MEQ extended release tablet Take 1 tablet by mouth 2 times daily (with meals) 25  Yes Rosio Rao MD   lidocaine (LIDODERM) 5 % apply 1 patch ONTO THE SKIN daily LEAVE ON FOR 12 HOURS and then OFF FOR 12 HOURS 3/25/25  Yes Rosio Rao MD   levocetirizine (XYZAL) 5 MG tablet take 1 tablet by mouth every evening once daily for 30 DAYS   Yes Rosio Rao MD   ibuprofen (ADVIL;MOTRIN) 800 MG tablet take 1 tablet by mouth every 8 hours with food OR MILK AS NEEDED FOR 30 DAYS 3/25/25  Yes Rosio Rao MD   ketoconazole (NIZORAL) 2 % cream APPLY TO BILTERAL FEET AS DIRECTED 10/2/24  Yes Rosio Rao MD   MULTIPLE MINERALS-VITAMINS PO Take 1 tablet by mouth daily   Yes Rosio Rao MD   oxyCODONE-acetaminophen (ROXICET) 5-325 MG/5ML solution Take by mouth every 4 hours as needed for Pain.   Yes Rosio Rao MD   ALPRAZolam (XANAX) 0.5 MG tablet Take by mouth.   Yes Automatic Reconciliation, Ar   ammonium lactate (LAC-HYDRIN) 12 % lotion ammonium lactate 12 % lotion   Yes Automatic Reconciliation, Ar   atorvastatin (LIPITOR) 20 MG tablet    Yes Automatic Reconciliation, Ar   hydroCHLOROthiazide (HYDRODIURIL) 25 MG tablet Take by mouth daily   Yes Automatic Reconciliation, Ar   oxyCODONE-acetaminophen (PERCOCET) 5-325 MG per tablet take 1 tablet by mouth three times a day if

## 2025-07-18 NOTE — H&P
[facility] [address] [address]        History and Physical       NAME:  Brooklynn Mitchell   :   1960   MRN:   [id number]             History of Present Illness:  Patient is a 65 y.o. who is seen for GERD.     PMH:  Past Medical History:   Diagnosis Date    Anxiety     Arthritis     Bipolar 1 disorder (HCC)     Degenerative disk disease     Depression     Diabetes (HCC)     \"border line\"    GERD (gastroesophageal reflux disease)     Hyperlipidemia     Hypertension     Mood disorder     affective    RUTH (obstructive sleep apnea) 2012    AHI: 8.5 per hour of sleep       PSH:  Past Surgical History:   Procedure Laterality Date    UPPER GASTROINTESTINAL ENDOSCOPY N/A 11/3/2023    EGD WITH BIOPSY performed by Miguel Young MD at [facility] ENDOSCOPY       Allergies:  Allergies   Allergen Reactions    Acetaminophen      Patient states not allergic - please remova     Penicillins Hives    Sulfa Antibiotics Hives       Home Medications:  Prior to Admission Medications   Prescriptions Last Dose Informant Patient Reported? Taking?   ALPRAZolam (XANAX) 0.5 MG tablet 2025  Yes Yes   Sig: Take by mouth.   MULTIPLE MINERALS-VITAMINS PO 2025  Yes Yes   Sig: Take 1 tablet by mouth daily   ammonium lactate (LAC-HYDRIN) 12 % lotion Past Week  Yes Yes   Sig: ammonium lactate 12 % lotion   atorvastatin (LIPITOR) 20 MG tablet 2025  Yes Yes   atorvastatin (LIPITOR) 40 MG tablet   Yes No   Sig: Take 1 tablet by mouth daily   hydroCHLOROthiazide (HYDRODIURIL) 25 MG tablet 2025  Yes Yes   Sig: Take by mouth daily   ibuprofen (ADVIL;MOTRIN) 800 MG tablet 2025  Yes Yes   Sig: take 1 tablet by mouth every 8 hours with food OR MILK AS NEEDED FOR 30 DAYS   ketoconazole (NIZORAL) 2 % cream Past Week  Yes Yes   Sig: APPLY TO BILTERAL FEET AS DIRECTED   levocetirizine (XYZAL) 5 MG tablet 2025  Yes Yes   Sig: take 1 tablet by mouth every evening once daily for 30

## 2025-07-18 NOTE — PROGRESS NOTES
Verified patient name and date of birth, scheduled procedure, and informed consent. Reviewed general discharge instructions and  information.  Assessed patient. Awake, alert, and oriented per baseline. Vital signs stable (see vital sign flowsheet). Respiratory status within defined limits, abdomen soft and non tender. Skin with in defined limits.      Initial RN admission and assessment performed and documented in Endoscopy navigator.     Patient evaluated by anesthesia in pre-procedure holding.     All procedural vital signs, airway assessment, and level of consciousness information monitored and recorded by anesthesia staff on the anesthesia record.     Report received from CRNA post procedure.  Patient transported to recovery area by RN.    Endoscopy post procedure time out was performed and specimens were verified with physician.    Endoscope was pre-cleaned at bedside immediately following procedure by Sil

## (undated) DEVICE — CANN NASAL O2 CAPNOGRAPHY AD -- FILTERLINE

## (undated) DEVICE — ORISE PROKNIFE 3.0 MM ELECTRODE: Brand: ORISE™ PROKNIFE

## (undated) DEVICE — BAG BELONG PT PERS CLEAR HANDL

## (undated) DEVICE — SYRINGE MED 20ML STD CLR PLAS LUERLOCK TIP N CTRL DISP

## (undated) DEVICE — BAG SPEC BIOHZD LF 2MIL 6X10IN -- CONVERT TO ITEM 357326

## (undated) DEVICE — CONTAINER SPEC 20 ML LID NEUT BUFF FORMALIN 10 % POLYPR STS

## (undated) DEVICE — ORISE PROKNIFE 1.5 MM ELECTRODE: Brand: ORISE™ PROKNIFE

## (undated) DEVICE — SET ADMIN 16ML TBNG L100IN 2 Y INJ SITE IV PIGGY BK DISP

## (undated) DEVICE — 1200 GUARD II KIT W/5MM TUBE W/O VAC TUBE: Brand: GUARDIAN

## (undated) DEVICE — FORCEPS BX L240CM JAW DIA2.8MM L CAP W/ NDL MIC MESH TOOTH

## (undated) DEVICE — SOLIDIFIER FLUID 3000 CC ABSORB

## (undated) DEVICE — SET EXTN TBNG L BOR 4 W STPCOCK ST 32IN PRIMING VOL 6ML

## (undated) DEVICE — ENDO CARRY-ON PROCEDURE KIT INCLUDES ENZYMATIC SPONGE, GAUZE, BIOHAZARD LABEL, TRAY, LUBRICANT, DIRTY SCOPE LABEL, WATER LABEL, TRAY, DRAWSTRING PAD, AND DEFENDO 4-PIECE KIT.: Brand: ENDO CARRY-ON PROCEDURE KIT

## (undated) DEVICE — FORCEP BX MESH TOOTH MIC 2.8 MMX240 CM NDL STRL RADIAL JAW 4

## (undated) DEVICE — CATH IV AUTOGRD BC BLU 22GA 25 -- INSYTE

## (undated) DEVICE — BW-412T DISP COMBO CLEANING BRUSH: Brand: SINGLE USE COMBINATION CLEANING BRUSH

## (undated) DEVICE — NEEDLE HYPO 18GA L1.5IN PNK S STL HUB POLYPR SHLD REG BVL

## (undated) DEVICE — KENDALL RADIOLUCENT FOAM MONITORING ELECTRODE -RECTANGULAR SHAPE: Brand: KENDALL

## (undated) DEVICE — KIT IV STRT W CHLORAPREP PD 1ML